# Patient Record
Sex: FEMALE | Race: WHITE | Employment: UNEMPLOYED | URBAN - METROPOLITAN AREA
[De-identification: names, ages, dates, MRNs, and addresses within clinical notes are randomized per-mention and may not be internally consistent; named-entity substitution may affect disease eponyms.]

---

## 2017-06-25 ENCOUNTER — HOSPITAL ENCOUNTER (EMERGENCY)
Facility: HOSPITAL | Age: 52
Discharge: HOME | End: 2017-06-25
Payer: COMMERCIAL

## 2017-06-25 ENCOUNTER — APPOINTMENT (OUTPATIENT)
Dept: GENERAL RADIOLOGY | Facility: HOSPITAL | Age: 52
End: 2017-06-25
Attending: NURSE PRACTITIONER
Payer: COMMERCIAL

## 2017-06-25 VITALS
BODY MASS INDEX: 28.89 KG/M2 | TEMPERATURE: 98 F | OXYGEN SATURATION: 98 % | WEIGHT: 153 LBS | SYSTOLIC BLOOD PRESSURE: 132 MMHG | HEART RATE: 68 BPM | HEIGHT: 61 IN | DIASTOLIC BLOOD PRESSURE: 82 MMHG | RESPIRATION RATE: 18 BRPM
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
## 2017-06-25 DIAGNOSIS — S61.259A CAT BITE OF FINGER, INITIAL ENCOUNTER: Primary | ICD-10-CM

## 2017-06-25 PROCEDURE — 96365 THER/PROPH/DIAG IV INF INIT: CPT

## 2017-06-25 PROCEDURE — 73130 X-RAY EXAM OF HAND: CPT | Performed by: NURSE PRACTITIONER

## 2017-06-25 PROCEDURE — 99284 EMERGENCY DEPT VISIT MOD MDM: CPT

## 2017-06-25 RX ORDER — IBUPROFEN 600 MG/1
600 TABLET ORAL ONCE
Status: COMPLETED | OUTPATIENT
Start: 2017-06-25 | End: 2017-06-25

## 2017-06-25 RX ORDER — AMOXICILLIN AND CLAVULANATE POTASSIUM 875; 125 MG/1; MG/1
1 TABLET, FILM COATED ORAL 2 TIMES DAILY
Qty: 20 TABLET | Refills: 0 | Status: ON HOLD | OUTPATIENT
Start: 2017-06-25 | End: 2017-06-27

## 2017-06-25 NOTE — ED PROVIDER NOTES
Patient Seen in: [facility] Emergency Department    History   Patient presents with:  Bite (integumentary)    Stated Complaint: cat bite    HPI    50-year-old female to the emergency department after her cat bit her on the right hand thumb, there small swelling noted, no redness, warmth, proximal streaking  PSYCH: calm, cooperative,        ED Course   Labs Reviewed - No data to display    MDM           Radiology findings: Xr Hand (min 3 Views), Right (cpt=73130)    Result Date: 6/25/2017  CONCLUSIO

## 2017-06-26 ENCOUNTER — HOSPITAL ENCOUNTER (INPATIENT)
Facility: HOSPITAL | Age: 52
LOS: 2 days | Discharge: HOME | DRG: 580 | End: 2017-06-28
Attending: EMERGENCY MEDICINE | Admitting: HOSPITALIST
Payer: COMMERCIAL
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
## 2017-06-26 DIAGNOSIS — L03.113 CELLULITIS OF RIGHT UPPER EXTREMITY: ICD-10-CM

## 2017-06-26 LAB
ANION GAP SERPL CALC-SCNC: 6 MMOL/L (ref 0–18)
BASOPHILS # BLD: 0 K/UL (ref 0–0.2)
BASOPHILS NFR BLD: 1 %
BUN SERPL-MCNC: 13 MG/DL (ref 8–20)
BUN/CREAT SERPL: 21 (ref 10–20)
CALCIUM SERPL-MCNC: 8.8 MG/DL (ref 8.5–10.5)
CHLORIDE SERPL-SCNC: 107 MMOL/L (ref 95–110)
CO2 SERPL-SCNC: 26 MMOL/L (ref 22–32)
CREAT SERPL-MCNC: 0.62 MG/DL (ref 0.5–1.5)
EOSINOPHIL # BLD: 0.2 K/UL (ref 0–0.7)
EOSINOPHIL NFR BLD: 3 %
ERYTHROCYTE [DISTWIDTH] IN BLOOD BY AUTOMATED COUNT: 12.9 % (ref 11–15)
GLUCOSE SERPL-MCNC: 107 MG/DL (ref 70–99)
HCT VFR BLD AUTO: 40.6 % (ref 35–48)
HGB BLD-MCNC: 13.8 G/DL (ref 12–16)
LYMPHOCYTES # BLD: 1.1 K/UL (ref 1–4)
LYMPHOCYTES NFR BLD: 15 %
MCH RBC QN AUTO: 32.9 PG (ref 27–32)
MCHC RBC AUTO-ENTMCNC: 34.1 G/DL (ref 32–37)
MCV RBC AUTO: 96.3 FL (ref 80–100)
MONOCYTES # BLD: 0.6 K/UL (ref 0–1)
MONOCYTES NFR BLD: 8 %
NEUTROPHILS # BLD AUTO: 5.4 K/UL (ref 1.8–7.7)
NEUTROPHILS NFR BLD: 74 %
OSMOLALITY UR CALC.SUM OF ELEC: 289 MOSM/KG (ref 275–295)
PLATELET # BLD AUTO: 168 K/UL (ref 140–400)
PMV BLD AUTO: 8.7 FL (ref 7.4–10.3)
POTASSIUM SERPL-SCNC: 3.6 MMOL/L (ref 3.3–5.1)
RBC # BLD AUTO: 4.21 M/UL (ref 3.7–5.4)
SODIUM SERPL-SCNC: 139 MMOL/L (ref 136–144)
WBC # BLD AUTO: 7.3 K/UL (ref 4–11)

## 2017-06-26 PROCEDURE — 99222 1ST HOSP IP/OBS MODERATE 55: CPT | Performed by: HOSPITALIST

## 2017-06-26 RX ORDER — ACETAMINOPHEN 325 MG/1
650 TABLET ORAL EVERY 6 HOURS PRN
Status: DISCONTINUED | OUTPATIENT
Start: 2017-06-26 | End: 2017-06-28

## 2017-06-26 RX ORDER — SODIUM CHLORIDE 9 MG/ML
INJECTION, SOLUTION INTRAVENOUS
Status: COMPLETED
Start: 2017-06-26 | End: 2017-06-26

## 2017-06-26 RX ORDER — POLYETHYLENE GLYCOL 3350 17 G/17G
17 POWDER, FOR SOLUTION ORAL DAILY PRN
Status: DISCONTINUED | OUTPATIENT
Start: 2017-06-26 | End: 2017-06-28

## 2017-06-26 RX ORDER — TRAMADOL HYDROCHLORIDE 50 MG/1
50 TABLET ORAL EVERY 6 HOURS PRN
Status: DISCONTINUED | OUTPATIENT
Start: 2017-06-26 | End: 2017-06-28

## 2017-06-26 RX ORDER — TRAMADOL HYDROCHLORIDE 50 MG/1
100 TABLET ORAL EVERY 6 HOURS PRN
Status: DISCONTINUED | OUTPATIENT
Start: 2017-06-26 | End: 2017-06-28

## 2017-06-26 RX ORDER — ONDANSETRON 2 MG/ML
4 INJECTION INTRAMUSCULAR; INTRAVENOUS EVERY 6 HOURS PRN
Status: DISCONTINUED | OUTPATIENT
Start: 2017-06-26 | End: 2017-06-28

## 2017-06-26 RX ORDER — SODIUM PHOSPHATE, DIBASIC AND SODIUM PHOSPHATE, MONOBASIC 7; 19 G/133ML; G/133ML
1 ENEMA RECTAL ONCE AS NEEDED
Status: DISCONTINUED | OUTPATIENT
Start: 2017-06-26 | End: 2017-06-28

## 2017-06-26 RX ORDER — TEMAZEPAM 7.5 MG/1
7.5 CAPSULE ORAL NIGHTLY PRN
Status: DISCONTINUED | OUTPATIENT
Start: 2017-06-26 | End: 2017-06-28

## 2017-06-26 RX ORDER — BISACODYL 10 MG
10 SUPPOSITORY, RECTAL RECTAL
Status: DISCONTINUED | OUTPATIENT
Start: 2017-06-26 | End: 2017-06-28

## 2017-06-26 RX ORDER — PROPRANOLOL HYDROCHLORIDE 20 MG/1
20 TABLET ORAL 2 TIMES DAILY
COMMUNITY
End: 2019-03-22

## 2017-06-26 RX ORDER — MORPHINE SULFATE 2 MG/ML
2 INJECTION, SOLUTION INTRAMUSCULAR; INTRAVENOUS EVERY 4 HOURS PRN
Status: DISCONTINUED | OUTPATIENT
Start: 2017-06-26 | End: 2017-06-28

## 2017-06-26 RX ORDER — 0.9 % SODIUM CHLORIDE 0.9 %
VIAL (ML) INJECTION
Status: COMPLETED
Start: 2017-06-26 | End: 2017-06-26

## 2017-06-26 RX ORDER — DIAPER,BRIEF,INFANT-TODD,DISP
EACH MISCELLANEOUS AS NEEDED
Status: DISCONTINUED | OUTPATIENT
Start: 2017-06-26 | End: 2017-06-28

## 2017-06-26 RX ORDER — PROPRANOLOL HYDROCHLORIDE 20 MG/1
20 TABLET ORAL 2 TIMES DAILY
Status: DISCONTINUED | OUTPATIENT
Start: 2017-06-26 | End: 2017-06-28

## 2017-06-26 NOTE — PROGRESS NOTES
[facility] dosing service    Initial Pharmacokinetic Consult for Vancomycin Dosing     Claudeen Peals is a 47year old female who is being treated for cellulitis.   Pharmacy has been asked to dose Vancomycin by Dr. Phebe Brittle    She is allergic to fish oil an

## 2017-06-26 NOTE — CONSULTS
[facility]    Report of Consultation    Deedee Brices Patient Status:  Inpatient    3/15/1965 MRN [id number]   Location [facility] 5SW/SE Attending Lu Raman MD   Hosp Day # 0 PCP Sofía DUFF     Date of Admission:  2017 Pertinent items are noted in HPI. A comprehensive review of systems was negative. Physical Exam:   Blood pressure 121/55, pulse 59, temperature 97.7 °F (36.5 °C), temperature source Oral, resp.  rate 20, height 5' 1\" (1.549 m), weight 153 lb (69.4 kg), elevate, BR with bathroom privileges. Proper way to ice and elevate and exercises demonstrated to patient. Will make NPO tonight and reassess progress in AM.  If developing abscess, will plan on I&D.   She voices understanding of above plan and is in [facility]

## 2017-06-26 NOTE — ED PROVIDER NOTES
Patient Seen in: [facility] 5sw/se    History   Patient presents with:  Bite (integumentary)    Stated Complaint: bit by cat    HPI    50-year-old female presents for evaluation of a cat bite.   Patient seen here yesterday, was bitten by her cat an atraumatic. Mouth/Throat: Oropharynx is clear and moist.   Eyes: Conjunctivae and EOM are normal.   Neck: Normal range of motion. Neck supple. Cardiovascular: Normal rate, regular rhythm, normal heart sounds and intact distal pulses.     Pulmonary/Chest CULTURE   BLOOD CULTURE   MRSA SCREEN BY PCR       ============================================================  ED Course  ------------------------------------------------------------  Time: 06/26 0808  Comment: Discussed with Dr. Georgiana Bo, no additional

## 2017-06-26 NOTE — ED NOTES
Care assumed. Alert and interactive S/P cat bite to R hand - 2 puncture areas on posterior aspect proximal wound with small amount of pus + edematous, indurated and painful. Wound irrigation and DSD applied. Defers analgesia at present.  ED Medic at bedside

## 2017-06-26 NOTE — ED INITIAL ASSESSMENT (HPI)
Pt reports she was bitten by her cat yesterday morning to her right hand. Seen/treated for same with iv abx, has had worsening swelling/pain.

## 2017-06-26 NOTE — PROGRESS NOTES
06/26/17 1529   Clinical Encounter Type   Visited With Patient   Routine Visit Introduction   Continue Visiting Yes   Referral From Patient;Nurse   Referral To    Patient Spiritual Encounters   Spiritual Needs Empathic listening    Spiritual Int

## 2017-06-26 NOTE — PROGRESS NOTES
[facility] Patient Status:  Inpatient    3/15/1965 MRN [id number]   Location [facility] 5SW/SE Attending Octavio Conde MD   Hosp Day # 0 PCP Rosemary DUFF     Date:  2017     Date of Admissi swelling  Extremities:  No edema, no cyanosis, no clubbing.  R hand erythema on dorsum with swelling  Neurologic:  nonfocal  Psychiatric:  Normal affect, calm and appropriate  Skin:  No rash, no lesion    Results:    Recent Labs   Lab  06/26/17   0647   WBC

## 2017-06-26 NOTE — CONSULTS
[facility]    Report of Consultation    Zena Vega Patient Status:  Inpatient    3/15/1965 MRN [id number]   Location [facility] 5SW/SE Attending Jacinta Taylor MD   Hosp Day # 0 PCP Chirag Alexandre     Date of Admission:  2017  D Anaphylaxis  Seafood                 Anaphylaxis    Review of Systems:    Pertinent items are noted in HPI. Physical Exam:   Blood pressure 121/55, pulse 59, temperature 97.7 °F (36.5 °C), temperature source Oral, resp.  rate 20, height 154.9

## 2017-06-27 ENCOUNTER — APPOINTMENT (OUTPATIENT)
Dept: MRI IMAGING | Facility: HOSPITAL | Age: 52
DRG: 580 | End: 2017-06-27
Attending: ORTHOPAEDIC SURGERY
Payer: COMMERCIAL

## 2017-06-27 ENCOUNTER — SURGERY (OUTPATIENT)
Age: 52
End: 2017-06-27

## 2017-06-27 ENCOUNTER — ANESTHESIA (OUTPATIENT)
Dept: SURGERY | Facility: HOSPITAL | Age: 52
DRG: 580 | End: 2017-06-27
Payer: COMMERCIAL

## 2017-06-27 ENCOUNTER — ANESTHESIA EVENT (OUTPATIENT)
Dept: SURGERY | Facility: HOSPITAL | Age: 52
DRG: 580 | End: 2017-06-27
Payer: COMMERCIAL

## 2017-06-27 LAB
ANION GAP SERPL CALC-SCNC: 8 MMOL/L (ref 0–18)
BASOPHILS # BLD: 0 K/UL (ref 0–0.2)
BASOPHILS NFR BLD: 1 %
BUN SERPL-MCNC: 10 MG/DL (ref 8–20)
BUN/CREAT SERPL: 17.2 (ref 10–20)
CALCIUM SERPL-MCNC: 8.2 MG/DL (ref 8.5–10.5)
CHLORIDE SERPL-SCNC: 105 MMOL/L (ref 95–110)
CO2 SERPL-SCNC: 25 MMOL/L (ref 22–32)
CREAT SERPL-MCNC: 0.58 MG/DL (ref 0.5–1.5)
EOSINOPHIL # BLD: 0.2 K/UL (ref 0–0.7)
EOSINOPHIL NFR BLD: 5 %
ERYTHROCYTE [DISTWIDTH] IN BLOOD BY AUTOMATED COUNT: 12.8 % (ref 11–15)
GLUCOSE SERPL-MCNC: 98 MG/DL (ref 70–99)
HCT VFR BLD AUTO: 40.5 % (ref 35–48)
HGB BLD-MCNC: 13.8 G/DL (ref 12–16)
LYMPHOCYTES # BLD: 1.4 K/UL (ref 1–4)
LYMPHOCYTES NFR BLD: 27 %
MCH RBC QN AUTO: 33.2 PG (ref 27–32)
MCHC RBC AUTO-ENTMCNC: 34 G/DL (ref 32–37)
MCV RBC AUTO: 97.8 FL (ref 80–100)
MONOCYTES # BLD: 0.3 K/UL (ref 0–1)
MONOCYTES NFR BLD: 6 %
MRSA DNA SPEC QL NAA+PROBE: NEGATIVE
NEUTROPHILS # BLD AUTO: 3.1 K/UL (ref 1.8–7.7)
NEUTROPHILS NFR BLD: 62 %
OSMOLALITY UR CALC.SUM OF ELEC: 285 MOSM/KG (ref 275–295)
PLATELET # BLD AUTO: 146 K/UL (ref 140–400)
PMV BLD AUTO: 9 FL (ref 7.4–10.3)
POTASSIUM SERPL-SCNC: 3.7 MMOL/L (ref 3.3–5.1)
RBC # BLD AUTO: 4.14 M/UL (ref 3.7–5.4)
SODIUM SERPL-SCNC: 138 MMOL/L (ref 136–144)
WBC # BLD AUTO: 5.1 K/UL (ref 4–11)

## 2017-06-27 PROCEDURE — 99232 SBSQ HOSP IP/OBS MODERATE 35: CPT | Performed by: HOSPITALIST

## 2017-06-27 PROCEDURE — 73218 MRI UPPER EXTREMITY W/O DYE: CPT | Performed by: ORTHOPAEDIC SURGERY

## 2017-06-27 PROCEDURE — 0J9J0ZZ DRAINAGE OF RIGHT HAND SUBCUTANEOUS TISSUE AND FASCIA, OPEN APPROACH: ICD-10-PCS | Performed by: ORTHOPAEDIC SURGERY

## 2017-06-27 RX ORDER — IBUPROFEN 400 MG/1
400 TABLET ORAL EVERY 6 HOURS PRN
Status: DISCONTINUED | OUTPATIENT
Start: 2017-06-27 | End: 2017-06-28

## 2017-06-27 RX ORDER — AMOXICILLIN AND CLAVULANATE POTASSIUM 875; 125 MG/1; MG/1
1 TABLET, FILM COATED ORAL 2 TIMES DAILY
Qty: 14 TABLET | Refills: 0 | Status: SHIPPED | OUTPATIENT
Start: 2017-06-27 | End: 2017-07-04

## 2017-06-27 RX ORDER — SULFAMETHOXAZOLE AND TRIMETHOPRIM 800; 160 MG/1; MG/1
1 TABLET ORAL 2 TIMES DAILY
Qty: 14 TABLET | Refills: 0 | Status: SHIPPED | OUTPATIENT
Start: 2017-06-27 | End: 2017-07-04

## 2017-06-27 RX ORDER — IBUPROFEN 600 MG/1
600 TABLET ORAL EVERY 6 HOURS PRN
Status: DISCONTINUED | OUTPATIENT
Start: 2017-06-27 | End: 2017-06-28

## 2017-06-27 RX ORDER — 0.9 % SODIUM CHLORIDE 0.9 %
VIAL (ML) INJECTION
Status: COMPLETED
Start: 2017-06-27 | End: 2017-06-27

## 2017-06-27 RX ORDER — TRAMADOL HYDROCHLORIDE 50 MG/1
100 TABLET ORAL EVERY 6 HOURS PRN
Qty: 20 TABLET | Refills: 0 | Status: SHIPPED
Start: 2017-06-27 | End: 2019-03-22

## 2017-06-27 RX ORDER — POTASSIUM CHLORIDE 20 MEQ/1
40 TABLET, EXTENDED RELEASE ORAL ONCE
Status: DISCONTINUED | OUTPATIENT
Start: 2017-06-27 | End: 2017-06-28

## 2017-06-27 RX ORDER — POTASSIUM CHLORIDE 14.9 MG/ML
20 INJECTION INTRAVENOUS ONCE
Status: COMPLETED | OUTPATIENT
Start: 2017-06-27 | End: 2017-06-27

## 2017-06-27 RX ORDER — ONDANSETRON 2 MG/ML
4 INJECTION INTRAMUSCULAR; INTRAVENOUS ONCE AS NEEDED
Status: DISCONTINUED | OUTPATIENT
Start: 2017-06-27 | End: 2017-06-27 | Stop reason: HOSPADM

## 2017-06-27 RX ORDER — BUPIVACAINE HYDROCHLORIDE 5 MG/ML
INJECTION, SOLUTION EPIDURAL; INTRACAUDAL AS NEEDED
Status: DISCONTINUED | OUTPATIENT
Start: 2017-06-27 | End: 2017-06-27 | Stop reason: HOSPADM

## 2017-06-27 RX ORDER — SODIUM CHLORIDE, SODIUM LACTATE, POTASSIUM CHLORIDE, CALCIUM CHLORIDE 600; 310; 30; 20 MG/100ML; MG/100ML; MG/100ML; MG/100ML
INJECTION, SOLUTION INTRAVENOUS CONTINUOUS
Status: DISCONTINUED | OUTPATIENT
Start: 2017-06-27 | End: 2017-06-28

## 2017-06-27 NOTE — PROGRESS NOTES
[facility] Dosing Service  Antibiotic Dosing    Jerrica Grandchild is a 47year old female for whom pharmacy is dosing 6/26 for treatment of  cellulitis and Pneumonia.  .  Other antibiotics (Not dosed by pharmacy): none    Allergies: is allergic to fish oil; se

## 2017-06-27 NOTE — ANESTHESIA PREPROCEDURE EVALUATION
Anesthesia PreOp Note    HPI:     Zena Vega is a 47year old female who presents for preoperative consultation requested by: Remington Roche MD    Date of Surgery: 6/26/2017 - 6/27/2017    Procedure(s):  EXTREMITY UPPER IRRIGATION & DEBRIDEMENT 650 mg 650 mg Oral Q6H PRN Bartolo Bamberger, MD     [facility] Hold] temazepam (RESTORIL) cap 7.5 mg 7.5 mg Oral Nightly PRN Bartolo Bamberger, MD     [facility] Hold] ondansetron HCl [facility]) injection 4 mg 4 mg Intravenous Q6H PRN Bartolo Bamberger, MD     [MAR Hold] PEG 3350 (MIRALAX) po activity: Not on file     Other Topics Concern   None on file     Social History Narrative   None on file       Available pre-op labs reviewed.     Lab Results  Component Value Date   WBC 5.1 06/27/2017   RBC 4.14 06/27/2017   HGB 13.8 06/27/2017   HCT 40.5 complications, and any alternative forms of anesthetic management. All of the patient's questions were answered to the best of my ability. The patient desires the anesthetic management as planned.   Raven Allen  6/27/2017 6:38 PM

## 2017-06-27 NOTE — PROGRESS NOTES
INFECTIOUS DISEASE PROGRESS NOTE    Cathleen Darby Patient Status:  Inpatient    3/15/1965 MRN [id number]   Location [facility] 5SW/SE Attending Azul Link MD   Hosp Day # 1 PCP [facility]     Subjective:  Patient seen and examined, notes, labs extensor pollicis longus,   extensor digitorum, extensor digiti minimi and extensor indices tendons (distal aspects of the third through fifth extensor compartments), however the fluid does not track into the tendon sheath seen along the proximal dorsal as

## 2017-06-27 NOTE — PROGRESS NOTES
[facility]    Progress Note    Jerrica Pedroza Patient Status:  Inpatient    3/15/1965 MRN [id number]   Location [facility] 5SW/SE Attending Blanco Jarquin MD   Hosp Day # 1 PCP [facility]     Subjective:  R hand feels about the same Q24H   • potassium chloride  20 mEq Intravenous Once   • Propranolol HCl  20 mg Oral BID      PRN Meds: Bacitracin Zinc, acetaminophen, temazepam, ondansetron HCl, PEG 3350, magnesium hydroxide, bisacodyl, FLEET ENEMA, TraMADol HCl, TraMADol HCl, morphINE

## 2017-06-27 NOTE — BRIEF OP NOTE
Pre-Operative Diagnosis: Cat bite [W55.01XA], right hand cellulitis, right hand abscess     Post-Operative Diagnosis: Cat bite [W55.01XA], right hand cellulitis, right hand abscess     Procedure Performed:   Procedure(s):  right hand incision and draina

## 2017-06-27 NOTE — PLAN OF CARE
Problem: Patient/Family Goals  Goal: Patient/Family Long Term Goal  Patient's Long Term Goal: Go home    Interventions:  - Antibiotics IV   - Wound site care  - Laboratory monitoring  - See additional Care Plan goals for specific interventions    Outcome:

## 2017-06-27 NOTE — ANESTHESIA POSTPROCEDURE EVALUATION
Patient: Zena Vega    Procedure Summary     Date:  06/27/17 Room / Location:  [address] [place] [address] MAIN OR 12 / [address] [place] [address] MAIN OR    Anesthesia Start:  1802 Anesthesia Stop:      Procedure:  EXTREMITY UPPER IRRIGATION & DEBRIDEMENT (Right Hand) Diagnosis:       Cat bite

## 2017-06-27 NOTE — H&P
Mady 60 Patient Status:  Inpatient    3/15/1965 MRN [id number]   Location [facility] 5SW/SE Attending Avery Mckoy MD   Hosp Day # 0 PCP [facility]      Date:  2017     Date o sounds  Musculoskeletal:  No joint swelling  Extremities:  No edema, no cyanosis, no clubbing.  R hand erythema on dorsum with swelling  Neurologic:  nonfocal  Psychiatric:  Normal affect, calm and appropriate  Skin:  No rash, no lesion     Results:

## 2017-06-28 VITALS
HEART RATE: 93 BPM | OXYGEN SATURATION: 95 % | TEMPERATURE: 97 F | WEIGHT: 153 LBS | RESPIRATION RATE: 18 BRPM | DIASTOLIC BLOOD PRESSURE: 48 MMHG | BODY MASS INDEX: 28.89 KG/M2 | HEIGHT: 61 IN | SYSTOLIC BLOOD PRESSURE: 100 MMHG

## 2017-06-28 LAB — POTASSIUM SERPL-SCNC: 3.7 MMOL/L (ref 3.3–5.1)

## 2017-06-28 PROCEDURE — 99239 HOSP IP/OBS DSCHRG MGMT >30: CPT | Performed by: HOSPITALIST

## 2017-06-28 RX ORDER — POTASSIUM CHLORIDE 20 MEQ/1
40 TABLET, EXTENDED RELEASE ORAL ONCE
Status: COMPLETED | OUTPATIENT
Start: 2017-06-28 | End: 2017-06-28

## 2017-06-28 RX ORDER — SODIUM CHLORIDE 9 MG/ML
INJECTION, SOLUTION INTRAVENOUS
Status: COMPLETED
Start: 2017-06-28 | End: 2017-06-28

## 2017-06-28 RX ORDER — 0.9 % SODIUM CHLORIDE 0.9 %
VIAL (ML) INJECTION
Status: COMPLETED
Start: 2017-06-28 | End: 2017-06-28

## 2017-06-28 NOTE — DISCHARGE SUMMARY
[facility] - [facility]    Discharge Summary    Shira Nuñez Patient Status:  Inpatient    3/15/1965 MRN [id number]   Location [facility] 5SW/SE Attending No att. providers found   Hosp Day # 2 PCP Ferny Aranda     Date of Admission:  office tomorrow  - home on augmentin 875 BID and bactrim DS 1 TAB BID for 7 days- scripts given  - tylenol, tramadol prn- script given      H/o arrythmia, palpitations - unclear of specific problem.  Not afib  - con't propanalol       Complications: none 7/4/2017  Quantity:  14 tablet  Refills:  0     TraMADol HCl 50 MG Tabs  Commonly known as:  ULTRAM      Take 2 tablets (100 mg total) by mouth every 6 (six) hours as needed.    Quantity:  20 tablet  Refills:  0        CONTINUE taking these medications

## 2017-06-28 NOTE — PROGRESS NOTES
INFECTIOUS DISEASE PROGRESS NOTE    Leena Colon Patient Status:  Inpatient    3/15/1965 MRN [id number]   Location [facility] 5SW/SE Attending Regi Lorenzo MD   Hosp Day # 2 PCP [facility]     Subjective:  Patient seen and examined, notes, labs collection to suggest abscess.  The fluid abuts the tendon sheaths of the extensor pollicis longus,   extensor digitorum, extensor digiti minimi and extensor indices tendons (distal aspects of the third through fifth extensor compartments), however the flui

## 2017-06-28 NOTE — PLAN OF CARE
Patient on electrolyte protocol. IV potassium ordered r/t NPO status. Potassium hung with NS @ 100 cc/hour for dilution. Patient complained of intense burning from arm into chest. Potassium stopped and flushed.  Night RN endorsed for electrolyte replacement

## 2017-07-11 NOTE — PAYOR COMM NOTE
--------------  DISCHARGE REVIEW    [facility]  Subscriber #:  [id number]  Authorization Number: [id number]    Admit date: 6/26/2017  6:16 AM  Discharge Date: 6/28/2017  3:39 PM     Admitting Physician: Ora Villalobos MD  Attending Natali Johnson after undergoing debridement by Dr. Suha Adrian. She will be discharged on oral antibiotics. Discharge Physical Exam:   Physical Exam:    General: No acute distress. Respiratory: Clear to auscultation bilaterally. No wheezes. No rhonchi.   Cardiovascular Script printed, Disp-14 tablet, R-0      Home Meds - Modified    Amoxicillin-Pot Clavulanate 875-125 MG Oral Tab  Take 1 tablet by mouth 2 (two) times daily. , Script printed, Disp-14 tablet, R-0      Home Meds - Unchanged    Propranolol HCl 20 MG Oral Tab 51308  [phone]             Ksenia Umaña In 1 week.     Specialty:  Family Practice  Contact information:  Josh   [phone]                   Follow up Labs and imaging: none        Time spent:  > 30 minutes    Anitra Scruggs Father        Smoking status: Never Smoker                                                              Alcohol use: Yes           1.2 oz/week     Glasses of wine: 2 per week      Review of Systems    Positive for stated complaint: bit by cat  Other system fluctuance or induration   Psychiatric: She has a normal mood and affect. Nursing note and vitals reviewed.           ED Course     Labs Reviewed   BASIC METABOLIC PANEL (8) - Abnormal; Notable for the following:        Result Value    Glucose 107 (*) Discharge Medication List        Present on Admission           ICD-10-CM Noted POA    * (Principal)Cellulitis of right upper extremity L03.113 6/26/2017 Unknown    Cat bite, initial encounter W55. 01XA 6/26/2017[TN.1]                 Signed by Derek King Amoxicillin-Pot Clavulanate 875-125 MG Oral Tab,  Take 1 tablet by mouth 2 (two) times daily.        Review of Systems:  A comprehensive 12 point review of systems was negative.  See History of Present Illness for other pertinent details.     Physical Exam:

## 2019-03-22 ENCOUNTER — OFFICE VISIT (OUTPATIENT)
Dept: OBGYN CLINIC | Facility: CLINIC | Age: 54
End: 2019-03-22
Payer: COMMERCIAL

## 2019-03-22 VITALS
SYSTOLIC BLOOD PRESSURE: 114 MMHG | HEIGHT: 61 IN | DIASTOLIC BLOOD PRESSURE: 78 MMHG | BODY MASS INDEX: 32.4 KG/M2 | WEIGHT: 171.63 LBS | HEART RATE: 67 BPM

## 2019-03-22 DIAGNOSIS — Z12.39 BREAST CANCER SCREENING: ICD-10-CM

## 2019-03-22 DIAGNOSIS — Z01.419 ENCOUNTER FOR GYNECOLOGICAL EXAMINATION WITHOUT ABNORMAL FINDING: Primary | ICD-10-CM

## 2019-03-22 PROBLEM — L03.113 CELLULITIS OF RIGHT UPPER EXTREMITY: Status: RESOLVED | Noted: 2017-06-26 | Resolved: 2019-03-22
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
## 2019-03-22 PROBLEM — Z53.20 COLONOSCOPY REFUSED: Status: ACTIVE | Noted: 2019-03-22

## 2019-03-22 PROCEDURE — 87624 HPV HI-RISK TYP POOLED RSLT: CPT | Performed by: OBSTETRICS & GYNECOLOGY

## 2019-03-22 PROCEDURE — 99386 PREV VISIT NEW AGE 40-64: CPT | Performed by: OBSTETRICS & GYNECOLOGY

## 2019-03-22 RX ORDER — METOPROLOL SUCCINATE 25 MG/1
TABLET, EXTENDED RELEASE ORAL
Refills: 3 | COMMUNITY
Start: 2019-02-16

## 2019-03-22 NOTE — PROGRESS NOTES
GYN H&P     3/22/2019  10:27 AM    CC: Patient is here for annual.     HPI: Patient is a 53year old  for annual. She has no complaints. She is sexually active and has minimal pain with sex (partner does not get full erection).  No further hot flashe 1989        Years since quittin.2      Smokeless tobacco: Never Used      Tobacco comment: over 30 years ago    Substance and Sexual Activity      Alcohol use:  Yes        Alcohol/week: 1.2 oz        Types: 2 Glasses of wine per week        Frequency: MD Juana

## 2019-03-24 LAB — HPV I/H RISK 1 DNA SPEC QL NAA+PROBE: NEGATIVE

## 2019-04-23 ENCOUNTER — HOSPITAL ENCOUNTER (OUTPATIENT)
Dept: MAMMOGRAPHY | Facility: HOSPITAL | Age: 54
Discharge: HOME | End: 2019-04-23
Attending: OBSTETRICS & GYNECOLOGY
Payer: COMMERCIAL

## 2019-04-23 DIAGNOSIS — Z12.39 BREAST CANCER SCREENING: ICD-10-CM

## 2019-04-23 PROCEDURE — 77067 SCR MAMMO BI INCL CAD: CPT | Performed by: OBSTETRICS & GYNECOLOGY

## 2019-04-23 PROCEDURE — 77063 BREAST TOMOSYNTHESIS BI: CPT | Performed by: OBSTETRICS & GYNECOLOGY

## 2019-11-25 RX ORDER — METOPROLOL SUCCINATE 25 MG/1
1 TABLET, EXTENDED RELEASE ORAL DAILY
COMMUNITY
Start: 2019-02-16 | End: 2019-11-26 | Stop reason: SDUPTHER

## 2019-11-26 ENCOUNTER — OFFICE VISIT (OUTPATIENT)
Dept: CARDIOLOGY | Age: 54
End: 2019-11-26

## 2019-11-26 VITALS
SYSTOLIC BLOOD PRESSURE: 120 MMHG | BODY MASS INDEX: 31.47 KG/M2 | HEIGHT: 62 IN | RESPIRATION RATE: 18 BRPM | DIASTOLIC BLOOD PRESSURE: 82 MMHG | OXYGEN SATURATION: 95 % | WEIGHT: 171 LBS | HEART RATE: 63 BPM

## 2019-11-26 DIAGNOSIS — R94.31 ABNORMAL ELECTROCARDIOGRAM (ECG) (EKG): ICD-10-CM

## 2019-11-26 DIAGNOSIS — I49.1 PAC (PREMATURE ATRIAL CONTRACTION): ICD-10-CM

## 2019-11-26 DIAGNOSIS — R00.2 PALPITATIONS: Primary | ICD-10-CM

## 2019-11-26 PROBLEM — E78.5 DYSLIPIDEMIA: Status: ACTIVE | Noted: 2019-11-26

## 2019-11-26 PROCEDURE — 93000 ELECTROCARDIOGRAM COMPLETE: CPT | Performed by: INTERNAL MEDICINE

## 2019-11-26 PROCEDURE — 99204 OFFICE O/P NEW MOD 45 MIN: CPT | Performed by: INTERNAL MEDICINE

## 2019-11-26 RX ORDER — METOPROLOL SUCCINATE 25 MG/1
25 TABLET, EXTENDED RELEASE ORAL DAILY
Qty: 90 TABLET | Refills: 3 | Status: SHIPPED | OUTPATIENT
Start: 2019-11-26 | End: 2020-11-30 | Stop reason: SDUPTHER

## 2019-11-26 ASSESSMENT — PATIENT HEALTH QUESTIONNAIRE - PHQ9
SUM OF ALL RESPONSES TO PHQ9 QUESTIONS 1 AND 2: 0
2. FEELING DOWN, DEPRESSED OR HOPELESS: NOT AT ALL
1. LITTLE INTEREST OR PLEASURE IN DOING THINGS: NOT AT ALL
SUM OF ALL RESPONSES TO PHQ9 QUESTIONS 1 AND 2: 0

## 2019-12-12 ENCOUNTER — LAB ENCOUNTER (OUTPATIENT)
Dept: LAB | Facility: HOSPITAL | Age: 54
End: 2019-12-12
Attending: INTERNAL MEDICINE
Payer: COMMERCIAL

## 2019-12-12 DIAGNOSIS — R00.2 PALPITATIONS: Primary | ICD-10-CM

## 2019-12-12 DIAGNOSIS — R94.31 ABNORMAL ELECTROCARDIOGRAM: ICD-10-CM

## 2019-12-12 LAB
ANION GAP SERPL CALC-SCNC: 6 MMOL/L
BUN SERPL-MCNC: 19 MG/DL
BUN/CREAT SERPL: 21.6
CALCIUM SERPL-MCNC: 9 MG/DL
CHLORIDE SERPL-SCNC: 106 MMOL/L
CHOLEST SERPL-MCNC: 210 MG/DL
CHOLEST/HDLC SERPL: NORMAL {RATIO}
CO2 SERPL-SCNC: 31 MMOL/L
CREAT SERPL-MCNC: 0.88 MG/DL
GLUCOSE SERPL-MCNC: 92 MG/DL
HDLC SERPL-MCNC: 57 MG/DL
LDLC SERPL CALC-MCNC: 127 MG/DL
LENGTH OF FAST TIME PATIENT: YES H
LENGTH OF FAST TIME PATIENT: YES H
NONHDLC SERPL-MCNC: 153 MG/DL
POTASSIUM SERPL-SCNC: 4.2 MMOL/L
SODIUM SERPL-SCNC: 143 MMOL/L
THYROID STIMULATING HORMONE: 1.67
TRIGL SERPL-MCNC: 130 MG/DL
VLDLC SERPL CALC-MCNC: 26 MG/DL

## 2019-12-12 PROCEDURE — 80061 LIPID PANEL: CPT

## 2019-12-12 PROCEDURE — 80048 BASIC METABOLIC PNL TOTAL CA: CPT

## 2019-12-12 PROCEDURE — 84443 ASSAY THYROID STIM HORMONE: CPT

## 2019-12-12 PROCEDURE — 36415 COLL VENOUS BLD VENIPUNCTURE: CPT

## 2019-12-13 ENCOUNTER — CLINICAL ABSTRACT (OUTPATIENT)
Dept: CARDIOLOGY | Age: 54
End: 2019-12-13

## 2019-12-13 ENCOUNTER — TELEPHONE (OUTPATIENT)
Dept: CARDIOLOGY | Age: 54
End: 2019-12-13

## 2019-12-21 ENCOUNTER — ADVANCED DIRECTIVES (OUTPATIENT)
Dept: OTHER | Age: 54
End: 2019-12-21

## 2020-01-09 ENCOUNTER — ANCILLARY PROCEDURE (OUTPATIENT)
Dept: CARDIOLOGY | Age: 55
End: 2020-01-09
Attending: INTERNAL MEDICINE

## 2020-01-09 DIAGNOSIS — R00.2 PALPITATIONS: ICD-10-CM

## 2020-01-09 DIAGNOSIS — R94.31 ABNORMAL ELECTROCARDIOGRAM (ECG) (EKG): ICD-10-CM

## 2020-01-09 PROCEDURE — 93306 TTE W/DOPPLER COMPLETE: CPT | Performed by: INTERNAL MEDICINE

## 2020-01-20 ENCOUNTER — TELEPHONE (OUTPATIENT)
Dept: CARDIOLOGY | Age: 55
End: 2020-01-20

## 2020-11-30 ENCOUNTER — OFFICE VISIT (OUTPATIENT)
Dept: CARDIOLOGY | Age: 55
End: 2020-11-30

## 2020-11-30 VITALS
BODY MASS INDEX: 29.81 KG/M2 | SYSTOLIC BLOOD PRESSURE: 120 MMHG | DIASTOLIC BLOOD PRESSURE: 68 MMHG | OXYGEN SATURATION: 98 % | RESPIRATION RATE: 18 BRPM | HEART RATE: 64 BPM | WEIGHT: 162 LBS | HEIGHT: 62 IN

## 2020-11-30 DIAGNOSIS — I49.1 PAC (PREMATURE ATRIAL CONTRACTION): ICD-10-CM

## 2020-11-30 DIAGNOSIS — E78.5 DYSLIPIDEMIA: ICD-10-CM

## 2020-11-30 DIAGNOSIS — R00.2 PALPITATIONS: Primary | ICD-10-CM

## 2020-11-30 PROCEDURE — 93000 ELECTROCARDIOGRAM COMPLETE: CPT | Performed by: INTERNAL MEDICINE

## 2020-11-30 PROCEDURE — 99214 OFFICE O/P EST MOD 30 MIN: CPT | Performed by: INTERNAL MEDICINE

## 2020-11-30 RX ORDER — METOPROLOL SUCCINATE 25 MG/1
25 TABLET, EXTENDED RELEASE ORAL DAILY
Qty: 90 TABLET | Refills: 3 | Status: SHIPPED | OUTPATIENT
Start: 2020-11-30

## 2020-11-30 ASSESSMENT — PATIENT HEALTH QUESTIONNAIRE - PHQ9
1. LITTLE INTEREST OR PLEASURE IN DOING THINGS: NOT AT ALL
CLINICAL INTERPRETATION OF PHQ9 SCORE: NO FURTHER SCREENING NEEDED
CLINICAL INTERPRETATION OF PHQ2 SCORE: NO FURTHER SCREENING NEEDED
SUM OF ALL RESPONSES TO PHQ9 QUESTIONS 1 AND 2: 0
2. FEELING DOWN, DEPRESSED OR HOPELESS: NOT AT ALL
SUM OF ALL RESPONSES TO PHQ9 QUESTIONS 1 AND 2: 0

## 2020-12-01 ENCOUNTER — TELEPHONE (OUTPATIENT)
Dept: CARDIOLOGY | Age: 55
End: 2020-12-01

## 2020-12-02 DIAGNOSIS — R00.2 PALPITATIONS: ICD-10-CM

## 2021-01-01 ENCOUNTER — EXTERNAL RECORD (OUTPATIENT)
Dept: HEALTH INFORMATION MANAGEMENT | Facility: OTHER | Age: 56
End: 2021-01-01

## 2021-11-08 ENCOUNTER — TELEPHONE (OUTPATIENT)
Dept: OBGYN CLINIC | Facility: CLINIC | Age: 56
End: 2021-11-08

## 2021-11-08 NOTE — TELEPHONE ENCOUNTER
Pt calling to request mammogram order be placed. Please notify when order is available so pt can schedule accordingly.

## 2021-11-08 NOTE — TELEPHONE ENCOUNTER
LMTCB. Pt has not been seen since 2019. Pt needs a well womens exam.     PHONE ROOM: pt needs to be scheduled.

## 2021-11-09 ENCOUNTER — TELEPHONE (OUTPATIENT)
Dept: CARDIOLOGY | Age: 56
End: 2021-11-09

## 2021-11-19 ENCOUNTER — APPOINTMENT (OUTPATIENT)
Dept: CARDIOLOGY | Age: 56
End: 2021-11-19

## 2021-11-26 ENCOUNTER — IMMUNIZATION (OUTPATIENT)
Dept: LAB | Facility: HOSPITAL | Age: 56
End: 2021-11-26
Attending: EMERGENCY MEDICINE
Payer: COMMERCIAL

## 2021-11-26 DIAGNOSIS — Z23 NEED FOR VACCINATION: Primary | ICD-10-CM

## 2021-11-26 PROCEDURE — 0004A SARSCOV2 VAC 30MCG/0.3ML IM: CPT

## 2021-12-21 ENCOUNTER — OFFICE VISIT (OUTPATIENT)
Dept: OBGYN CLINIC | Facility: CLINIC | Age: 56
End: 2021-12-21
Payer: COMMERCIAL

## 2021-12-21 VITALS
HEIGHT: 61 IN | SYSTOLIC BLOOD PRESSURE: 120 MMHG | WEIGHT: 165 LBS | BODY MASS INDEX: 31.15 KG/M2 | DIASTOLIC BLOOD PRESSURE: 78 MMHG

## 2021-12-21 DIAGNOSIS — Z01.419 ENCOUNTER FOR GYNECOLOGICAL EXAMINATION WITHOUT ABNORMAL FINDING: ICD-10-CM

## 2021-12-21 DIAGNOSIS — Z12.31 ENCOUNTER FOR SCREENING MAMMOGRAM FOR MALIGNANT NEOPLASM OF BREAST: ICD-10-CM

## 2021-12-21 DIAGNOSIS — Z12.11 COLON CANCER SCREENING: Primary | ICD-10-CM

## 2021-12-21 PROCEDURE — 3008F BODY MASS INDEX DOCD: CPT | Performed by: OBSTETRICS & GYNECOLOGY

## 2021-12-21 PROCEDURE — 3074F SYST BP LT 130 MM HG: CPT | Performed by: OBSTETRICS & GYNECOLOGY

## 2021-12-21 PROCEDURE — 99396 PREV VISIT EST AGE 40-64: CPT | Performed by: OBSTETRICS & GYNECOLOGY

## 2021-12-21 PROCEDURE — 3078F DIAST BP <80 MM HG: CPT | Performed by: OBSTETRICS & GYNECOLOGY

## 2021-12-21 PROCEDURE — 87624 HPV HI-RISK TYP POOLED RSLT: CPT | Performed by: OBSTETRICS & GYNECOLOGY

## 2021-12-21 NOTE — PROGRESS NOTES
GYN H&P     2021  9:34 AM    CC: Patient is here for annual.     HPI: Patient is a 61year old  for annual. No vaginal bleeding. No pain with sex. No pain with sex. No LMP recorded.  Patient is postmenopausal.    OB History    Para T Wine      Drug use: No      Sexual activity: Yes        Partners: Male    Social History    Social History Narrative      Live with spouse       Feel safe      Medications reviewed.  See active list.     /78   Ht 61\"   Wt 165 lb (74.8 kg)   Breastfee

## 2022-01-22 ENCOUNTER — HOSPITAL ENCOUNTER (OUTPATIENT)
Dept: MAMMOGRAPHY | Facility: HOSPITAL | Age: 57
Discharge: HOME | End: 2022-01-22
Attending: OBSTETRICS & GYNECOLOGY
Payer: COMMERCIAL

## 2022-01-22 DIAGNOSIS — Z12.31 ENCOUNTER FOR SCREENING MAMMOGRAM FOR MALIGNANT NEOPLASM OF BREAST: ICD-10-CM

## 2022-01-22 PROCEDURE — 77063 BREAST TOMOSYNTHESIS BI: CPT | Performed by: OBSTETRICS & GYNECOLOGY

## 2022-01-22 PROCEDURE — 77067 SCR MAMMO BI INCL CAD: CPT | Performed by: OBSTETRICS & GYNECOLOGY

## 2022-04-11 NOTE — LETTER
[facility]  [address], [place], [place]    Authorization for Surgical Operation and Procedure                               I hereby authorize Chong Jules MD, my physician and his/her assistants (if applicable), which may include medical students, residents, and/or fellows, to perform the following surgical operation/ procedure and administer such anesthesia as may be determined necessary by my physician: Operation/Procedure name (s) LAPAROSCOPIC POSSIBLE OPEN APPENDECTOMY on Cesia Moore   2.   I recognize that during the surgical operation/procedure, unforeseen conditions may necessitate additional or different procedures than those listed above.  I, therefore, further authorize and request that the above-named surgeon, assistants, or designees perform such procedures as are, in their judgment, necessary and desirable.    3.   My surgeon/physician has discussed prior to my surgery the potential benefits, risks and side effects of this procedure; the likelihood of achieving goals; and potential problems that might occur during recuperation.  They also discussed reasonable alternatives to the procedure, including risks, benefits, and side effects related to the alternatives and risks related to not receiving this procedure.  I have had all my questions answered and I acknowledge that no guarantee has been made as to the result that may be obtained.    4.   Should the need arise during my operation/procedure, which includes change of level of care prior to discharge, I also consent to the administration of blood and/or blood products.  Further, I understand that despite careful testing and screening of blood or blood products by collecting agencies, I may still be subject to ill effects as a result of receiving a blood transfusion and/or blood products.  The following are some, but not all, of the potential risks that can occur: fever and allergic reactions, hemolytic reactions,  transmission of diseases such as Hepatitis, AIDS and Cytomegalovirus (CMV) and fluid overload.  In the event that I wish to have an autologous transfusion of my own blood, or a directed donor transfusion, I will discuss this with my physician.  Check only if Refusing Blood or Blood Products  I understand refusal of blood or blood products as deemed necessary by my physician may have serious consequences to my condition to include possible death. I hereby assume responsibility for my refusal and release the hospital, its personnel, and my physicians from any responsibility for the consequences of my refusal.    o  Refuse   5.   I authorize the use of any specimen, organs, tissues, body parts or foreign objects that may be removed from my body during the operation/procedure for diagnosis, research or teaching purposes and their subsequent disposal by hospital authorities.  I also authorize the release of specimen test results and/or written reports to my treating physician on the hospital medical staff or other referring or consulting physicians involved in my care, at the discretion of the Pathologist or my treating physician.    6.   I consent to the photographing or videotaping of the operations or procedures to be performed, including appropriate portions of my body for medical, scientific, or educational purposes, provided my identity is not revealed by the pictures or by descriptive texts accompanying them.  If the procedure has been photographed/videotaped, the surgeon will obtain the original picture, image, videotape or CD.  The hospital will not be responsible for storage, release or maintenance of the picture, image, tape or CD.    7.   I consent to the presence of a  or observers in the operating room as deemed necessary by my physician or their designees.    8.   I recognize that in the event my procedure results in extended X-Ray/fluoroscopy time, I may develop a skin reaction.    9. If  I have a Do Not Attempt Resuscitation (DNAR) order in place, that status will be suspended while in the operating room, procedural suite, and during the recovery period unless otherwise explicitly stated by me (or a person authorized to consent on my behalf). The surgeon or my attending physician will determine when the applicable recovery period ends for purposes of reinstating the DNAR order.  10. Patients having a sterilization procedure: I understand that if the procedure is successful the results will be permanent and it will therefore be impossible for me to inseminate, conceive, or bear children.  I also understand that the procedure is intended to result in sterility, although the result has not been guaranteed.   11. I acknowledge that my physician has explained sedation/analgesia administration to me including the risk and benefits I consent to the administration of sedation/analgesia as may be necessary or desirable in the judgment of my physician.    I CERTIFY THAT I HAVE READ AND FULLY UNDERSTAND THE ABOVE CONSENT TO OPERATION and/or OTHER PROCEDURE.     ____________________________________  _________________________________        ______________________________  Signature of Patient    Signature of Responsible Person                Printed Name of Responsible Person                                      ____________________________________  _____________________________                ________________________________  Signature of Witness        Date  Time         Relationship to Patient    STATEMENT OF PHYSICIAN My signature below affirms that prior to the time of the procedure; I have explained to the patient and/or his/her legal representative, the risks and benefits involved in the proposed treatment and any reasonable alternative to the proposed treatment. I have also explained the risks and benefits involved in refusal of the proposed treatment and alternatives to the proposed treatment and have  answered the patient's questions. If I have a significant financial interest in a co-management agreement or a significant financial interest in any product or implant, or other significant relationship used in this procedure/surgery, I have disclosed this and had a discussion with my patient.     _____________________________________________________              _____________________________  (Signature of Physician)                                                                                         (Date)                                   (Time)  Patient Name: Cesia SHANNON Teresa      : 3/15/1965      Printed: 2025     Medical Record #: [id number]                                      Page 1 of 1   Protopic Counseling: Patient may experience a mild burning sensation during topical application. Protopic is not approved in children less than 2 years of age. There have been case reports of hematologic and skin malignancies in patients using topical calcineurin inhibitors although causality is questionable.

## 2022-12-27 ENCOUNTER — TELEPHONE (OUTPATIENT)
Dept: OBGYN CLINIC | Facility: CLINIC | Age: 57
End: 2022-12-27

## 2022-12-27 DIAGNOSIS — Z12.31 SCREENING MAMMOGRAM FOR BREAST CANCER: Primary | ICD-10-CM

## 2022-12-27 NOTE — TELEPHONE ENCOUNTER
RN spoke with pt and told her that mammo order was placed. CS number provided. Pt verbalized understanding and agreed with POC.

## 2022-12-27 NOTE — TELEPHONE ENCOUNTER
Patient is calling requesting a referral request for a mammogram referral. Please follow up with patient.

## 2023-01-28 ENCOUNTER — HOSPITAL ENCOUNTER (OUTPATIENT)
Dept: MAMMOGRAPHY | Facility: HOSPITAL | Age: 58
Discharge: HOME | End: 2023-01-28
Attending: OBSTETRICS & GYNECOLOGY
Payer: COMMERCIAL

## 2023-01-28 DIAGNOSIS — Z12.31 SCREENING MAMMOGRAM FOR BREAST CANCER: ICD-10-CM

## 2023-01-28 PROCEDURE — 77067 SCR MAMMO BI INCL CAD: CPT | Performed by: OBSTETRICS & GYNECOLOGY

## 2023-01-28 PROCEDURE — 77063 BREAST TOMOSYNTHESIS BI: CPT | Performed by: OBSTETRICS & GYNECOLOGY

## 2023-04-07 ENCOUNTER — LAB ENCOUNTER (OUTPATIENT)
Dept: LAB | Age: 58
End: 2023-04-07
Attending: INTERNAL MEDICINE
Payer: COMMERCIAL

## 2023-04-07 ENCOUNTER — OFFICE VISIT (OUTPATIENT)
Dept: INTERNAL MEDICINE CLINIC | Facility: CLINIC | Age: 58
End: 2023-04-07

## 2023-04-07 VITALS
OXYGEN SATURATION: 97 % | DIASTOLIC BLOOD PRESSURE: 72 MMHG | WEIGHT: 173.5 LBS | BODY MASS INDEX: 32.76 KG/M2 | HEIGHT: 61 IN | SYSTOLIC BLOOD PRESSURE: 106 MMHG | HEART RATE: 72 BPM

## 2023-04-07 DIAGNOSIS — Z91.013 SEAFOOD ALLERGY: ICD-10-CM

## 2023-04-07 DIAGNOSIS — L40.9 PSORIASIS: ICD-10-CM

## 2023-04-07 DIAGNOSIS — Z00.00 PHYSICAL EXAM: Primary | ICD-10-CM

## 2023-04-07 DIAGNOSIS — R00.2 PALPITATIONS: ICD-10-CM

## 2023-04-07 DIAGNOSIS — Z00.00 PHYSICAL EXAM: ICD-10-CM

## 2023-04-07 PROBLEM — I49.9 ARRHYTHMIA: Status: ACTIVE | Noted: 2023-04-07

## 2023-04-07 PROBLEM — I49.1 PAC (PREMATURE ATRIAL CONTRACTION): Status: ACTIVE | Noted: 2023-04-07

## 2023-04-07 LAB
ALBUMIN SERPL-MCNC: 4 G/DL (ref 3.4–5)
ALBUMIN/GLOB SERPL: 1.1 {RATIO} (ref 1–2)
ALP LIVER SERPL-CCNC: 67 U/L
ALT SERPL-CCNC: 36 U/L
ANION GAP SERPL CALC-SCNC: 6 MMOL/L (ref 0–18)
AST SERPL-CCNC: 19 U/L (ref 15–37)
BASOPHILS # BLD AUTO: 0.06 X10(3) UL (ref 0–0.2)
BASOPHILS NFR BLD AUTO: 0.9 %
BILIRUB SERPL-MCNC: 0.9 MG/DL (ref 0.1–2)
BUN BLD-MCNC: 13 MG/DL (ref 7–18)
BUN/CREAT SERPL: 16.7 (ref 10–20)
CALCIUM BLD-MCNC: 9.2 MG/DL (ref 8.5–10.1)
CHLORIDE SERPL-SCNC: 107 MMOL/L (ref 98–112)
CHOLEST SERPL-MCNC: 205 MG/DL (ref ?–200)
CO2 SERPL-SCNC: 28 MMOL/L (ref 21–32)
CREAT BLD-MCNC: 0.78 MG/DL
DEPRECATED RDW RBC AUTO: 42.7 FL (ref 35.1–46.3)
EOSINOPHIL # BLD AUTO: 0.2 X10(3) UL (ref 0–0.7)
EOSINOPHIL NFR BLD AUTO: 3.1 %
ERYTHROCYTE [DISTWIDTH] IN BLOOD BY AUTOMATED COUNT: 11.8 % (ref 11–15)
EST. AVERAGE GLUCOSE BLD GHB EST-MCNC: 105 MG/DL (ref 68–126)
FASTING PATIENT LIPID ANSWER: YES
FASTING STATUS PATIENT QL REPORTED: YES
GFR SERPLBLD BASED ON 1.73 SQ M-ARVRAT: 87 ML/MIN/1.73M2 (ref 60–?)
GLOBULIN PLAS-MCNC: 3.5 G/DL (ref 2.8–4.4)
GLUCOSE BLD-MCNC: 97 MG/DL (ref 70–99)
HBA1C MFR BLD: 5.3 % (ref ?–5.7)
HCT VFR BLD AUTO: 43.6 %
HDLC SERPL-MCNC: 62 MG/DL (ref 40–59)
HGB BLD-MCNC: 14.3 G/DL
IMM GRANULOCYTES # BLD AUTO: 0.02 X10(3) UL (ref 0–1)
IMM GRANULOCYTES NFR BLD: 0.3 %
LDLC SERPL CALC-MCNC: 118 MG/DL (ref ?–100)
LYMPHOCYTES # BLD AUTO: 2.14 X10(3) UL (ref 1–4)
LYMPHOCYTES NFR BLD AUTO: 33.4 %
MCH RBC QN AUTO: 32.2 PG (ref 26–34)
MCHC RBC AUTO-ENTMCNC: 32.8 G/DL (ref 31–37)
MCV RBC AUTO: 98.2 FL
MONOCYTES # BLD AUTO: 0.57 X10(3) UL (ref 0.1–1)
MONOCYTES NFR BLD AUTO: 8.9 %
NEUTROPHILS # BLD AUTO: 3.42 X10 (3) UL (ref 1.5–7.7)
NEUTROPHILS # BLD AUTO: 3.42 X10(3) UL (ref 1.5–7.7)
NEUTROPHILS NFR BLD AUTO: 53.4 %
NONHDLC SERPL-MCNC: 143 MG/DL (ref ?–130)
OSMOLALITY SERPL CALC.SUM OF ELEC: 292 MOSM/KG (ref 275–295)
PLATELET # BLD AUTO: 183 10(3)UL (ref 150–450)
POTASSIUM SERPL-SCNC: 3.9 MMOL/L (ref 3.5–5.1)
PROT SERPL-MCNC: 7.5 G/DL (ref 6.4–8.2)
RBC # BLD AUTO: 4.44 X10(6)UL
SODIUM SERPL-SCNC: 141 MMOL/L (ref 136–145)
TRIGL SERPL-MCNC: 140 MG/DL (ref 30–149)
TSI SER-ACNC: 1.56 MIU/ML (ref 0.36–3.74)
VLDLC SERPL CALC-MCNC: 24 MG/DL (ref 0–30)
WBC # BLD AUTO: 6.4 X10(3) UL (ref 4–11)

## 2023-04-07 PROCEDURE — 3074F SYST BP LT 130 MM HG: CPT | Performed by: INTERNAL MEDICINE

## 2023-04-07 PROCEDURE — 80061 LIPID PANEL: CPT

## 2023-04-07 PROCEDURE — 85025 COMPLETE CBC W/AUTO DIFF WBC: CPT

## 2023-04-07 PROCEDURE — 83036 HEMOGLOBIN GLYCOSYLATED A1C: CPT

## 2023-04-07 PROCEDURE — 99386 PREV VISIT NEW AGE 40-64: CPT | Performed by: INTERNAL MEDICINE

## 2023-04-07 PROCEDURE — 84443 ASSAY THYROID STIM HORMONE: CPT

## 2023-04-07 PROCEDURE — 3078F DIAST BP <80 MM HG: CPT | Performed by: INTERNAL MEDICINE

## 2023-04-07 PROCEDURE — 3008F BODY MASS INDEX DOCD: CPT | Performed by: INTERNAL MEDICINE

## 2023-04-07 PROCEDURE — 80053 COMPREHEN METABOLIC PANEL: CPT

## 2023-04-07 PROCEDURE — 36415 COLL VENOUS BLD VENIPUNCTURE: CPT

## 2023-04-10 ENCOUNTER — TELEPHONE (OUTPATIENT)
Dept: INTERNAL MEDICINE CLINIC | Facility: CLINIC | Age: 58
End: 2023-04-10

## 2023-04-10 NOTE — TELEPHONE ENCOUNTER
Patient called and relayed Dr Reyna Ames message. Patient verbalized understanding with no further questions noted.

## 2023-04-10 NOTE — TELEPHONE ENCOUNTER
----- Message from Ashanti Duke MD sent at 4/10/2023  9:01 AM CDT -----  Lab results reviewed. Cholesterol is 205 with LDL of 118--goal LDL of less than 130. HDL is very good at 62--at goal HDL of greater than 40. Hemoglobin A1c is normal at 5.3--therefore no evidence of diabetes. CMP shows normal electrolytes, kidney function and liver function test.    Thyroid function test is normal.    CBC shows normal hemoglobin and normal white cell count. Therefore, overall, all labs look good. No intervention is needed.

## 2023-06-21 NOTE — PROGRESS NOTES
----- Message from [facility] sent at 2023  2:50 PM CDT -----  Regarding: inhaler  Contact: [phone]  Dr Sneha Hsu,  My internist gave me samples of Trelegy that has three drugs. I've tired both doses, 200 mcg and 100 mcg. Both work wonderful for me. Dr. Lajoyce Phoenix suggested that I go with the lower dosage 100 mcg. In my previous message you had your nurse call me and told me you were good with it. Would you be able to fax a prescription to my mail order [employer]? [facility] in [address] [place] [address] closed its door on 2023 and many offices were closed including my internist Dr. Lajoyce Phoenix. He is in the process of getting reestablished in a new location. As a patient, I'm in a bit of a bind. Please let me know if you plan to order it for me.   Sincerely,  Harmeet Levine   [facility] [place] [facility] - [facility]    Progress Note    Dorna Model Patient Status:  Inpatient    3/15/1965 MRN [id number]   Location [facility] 5SW/SE Attending Candy Marie MD   [facility]       Subjective:   Dorna Model is a(n)

## 2023-10-16 ENCOUNTER — TELEPHONE (OUTPATIENT)
Dept: INTERNAL MEDICINE CLINIC | Facility: CLINIC | Age: 58
End: 2023-10-16

## 2023-10-16 NOTE — TELEPHONE ENCOUNTER
Respiratory infection triage:    Fever:  []  No fever  [x]  Fever>100.4 - 101-103 Wednesday. Resolved within 24hrs    Cough:  [] Tight cough  [] Cough with exertion  [] Dry cough  [x] Sputum production, Color: yellow    Breathing:  [] Mild shortness of breath interfering with activity  [] Wheezing  [] Pain with deep breathing  [] Using inhaler    Other symptoms:  [] Sore throat  [] Difficulty swallowing  [x] Nasal drainage/congestion  [] Sinus congestion/pressure - reports PND  [] Ear pain  [] Body aches  [] Poor appetite  [] Loss of sense of smell   [] Loss of sense of taste  []Conjunctivitis? [] Any recent travel? [] Any sick contacts? [] Are you a healthcare worker? ADDITIONAL NOTES:  To Dr. Alison Mata to advise---    Spoke to pt. Reports sx onset Tuesday. Developed body aches and fever from 101-103 on Wednesday. Resolved within 24 hours. Voice is very hoarse. Reports history of similar symptoms progressing to bronchitis or pneumonia. Reports chest congestion mainly at night. Using saline nasal spray, flonase, and vicks vaporub. She did buy robitussin DM. Asking if ok to take this. Home covid test negative today. Pt hoping to be seen or looking for MD recommendations. She has noted improvement in symptoms in the past with codeine cough syrup and zpak. Takes care of grandchildren this afternoon starting at 2:30. OK to use save slot? Notified patient that we will route this message to the doctor and see what their recommendations would be. In the meantime, if anything worsens, they were advised to call back or seek emergent evaluation.

## 2023-10-16 NOTE — TELEPHONE ENCOUNTER
Patient is calling last week she developed a fever for 2 days between 101-103. Patient is now experiencing chest congestion mainly at night, voice is hoarse.     Patient doesn't have a covid test at home, she would like to be seen    Please call patient [phone]

## 2023-10-16 NOTE — TELEPHONE ENCOUNTER
Would recommend over-the-counter Robitussin-DM to help with cough and congestion. Patient can also use Tylenol to help with fevers and myalgias. She should push more fluids and get adequate rest.  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 If symptoms progress, patient should come to urgent care so that chest x-ray, COVID testing and lab work can be obtained.

## 2023-12-17 ENCOUNTER — HOSPITAL ENCOUNTER (OUTPATIENT)
Age: 58
Discharge: HOME | End: 2023-12-17
Payer: COMMERCIAL

## 2023-12-17 VITALS
OXYGEN SATURATION: 96 % | TEMPERATURE: 100 F | SYSTOLIC BLOOD PRESSURE: 147 MMHG | HEART RATE: 76 BPM | DIASTOLIC BLOOD PRESSURE: 74 MMHG | RESPIRATION RATE: 20 BRPM

## 2023-12-17 DIAGNOSIS — J06.9 VIRAL URI WITH COUGH: Primary | ICD-10-CM

## 2023-12-17 LAB
POCT INFLUENZA A: NEGATIVE
POCT INFLUENZA B: NEGATIVE
SARS-COV-2 RNA RESP QL NAA+PROBE: NOT DETECTED

## 2023-12-17 PROCEDURE — 99203 OFFICE O/P NEW LOW 30 MIN: CPT

## 2023-12-17 PROCEDURE — 99213 OFFICE O/P EST LOW 20 MIN: CPT

## 2023-12-17 PROCEDURE — 87502 INFLUENZA DNA AMP PROBE: CPT | Performed by: PHYSICIAN ASSISTANT

## 2023-12-17 RX ORDER — BENZONATATE 100 MG/1
100 CAPSULE ORAL 3 TIMES DAILY PRN
Qty: 21 CAPSULE | Refills: 0 | Status: SHIPPED | OUTPATIENT
Start: 2023-12-17 | End: 2023-12-24

## 2023-12-17 RX ORDER — ALBUTEROL SULFATE 90 UG/1
2 AEROSOL, METERED RESPIRATORY (INHALATION) EVERY 4 HOURS PRN
Qty: 1 EACH | Refills: 0 | Status: SHIPPED | OUTPATIENT
Start: 2023-12-17 | End: 2024-01-16

## 2023-12-28 ENCOUNTER — OFFICE VISIT (OUTPATIENT)
Dept: INTERNAL MEDICINE CLINIC | Facility: CLINIC | Age: 58
End: 2023-12-28

## 2023-12-28 VITALS
TEMPERATURE: 98 F | BODY MASS INDEX: 30.78 KG/M2 | DIASTOLIC BLOOD PRESSURE: 75 MMHG | OXYGEN SATURATION: 97 % | WEIGHT: 163 LBS | SYSTOLIC BLOOD PRESSURE: 104 MMHG | HEIGHT: 61 IN | HEART RATE: 63 BPM

## 2023-12-28 DIAGNOSIS — R05.1 ACUTE COUGH: Primary | ICD-10-CM

## 2023-12-28 PROCEDURE — 99213 OFFICE O/P EST LOW 20 MIN: CPT | Performed by: INTERNAL MEDICINE

## 2023-12-28 PROCEDURE — 3008F BODY MASS INDEX DOCD: CPT | Performed by: INTERNAL MEDICINE

## 2023-12-28 PROCEDURE — 3078F DIAST BP <80 MM HG: CPT | Performed by: INTERNAL MEDICINE

## 2023-12-28 PROCEDURE — 3074F SYST BP LT 130 MM HG: CPT | Performed by: INTERNAL MEDICINE

## 2023-12-28 RX ORDER — CODEINE PHOSPHATE AND GUAIFENESIN 10; 100 MG/5ML; MG/5ML
5 SOLUTION ORAL 3 TIMES DAILY PRN
Qty: 100 ML | Refills: 0 | Status: SHIPPED | OUTPATIENT
Start: 2023-12-28

## 2023-12-28 RX ORDER — CLINDAMYCIN PHOSPHATE 10 MG/G
GEL TOPICAL
COMMUNITY
Start: 2023-12-21

## 2023-12-28 RX ORDER — AZITHROMYCIN 250 MG/1
TABLET, FILM COATED ORAL
Qty: 6 TABLET | Refills: 0 | Status: SHIPPED | OUTPATIENT
Start: 2023-12-28 | End: 2024-01-01

## 2023-12-28 NOTE — PROGRESS NOTES
Subjective:     Patient ID: Dusty Zayas is a 60year old female. HPI    History/Other:   She came in today to establish care and also following from    She went to [facility] on  because of upper respiratory symptoms and cough. They did COVID and flu was negative. She was discharged home. They gave her Tessalon Perles. She states that she got worse after start taking Tessalon Perles. She stopped taking. She continues to have a cough and congestion. Review of Systems   Constitutional: Negative. HENT:  Positive for congestion. Eyes: Negative. Respiratory:  Positive for cough. Cardiovascular: Negative. Gastrointestinal: Negative. Genitourinary: Negative. Musculoskeletal: Negative. Skin: Negative. Neurological:  Positive for weakness. Psychiatric/Behavioral: Negative. Current Outpatient Medications   Medication Sig Dispense Refill    Clindamycin Phosphate 1 % External Gel APPLY TOPICALLY TO THE AFFECTED AREA TWICE DAILY UNTIL GONE      tretinoin 0.025 % External Cream Apply 1 Application topically nightly. albuterol 108 (90 Base) MCG/ACT Inhalation Aero Soln Inhale 2 puffs into the lungs every 4 (four) hours as needed for Wheezing. 1 each 0    Metoprolol Succinate ER 25 MG Oral Tablet 24 Hr   3     Allergies: Allergies   Allergen Reactions    Fish Oil ANAPHYLAXIS    Seafood ANAPHYLAXIS    Vancomycin SWELLING     Swelling of the mouth, generalized itchiness and flushes.      Benzonatate Coughing       Past Medical History:   Diagnosis Date    Arrhythmia     \"palpitations\"    PAC (premature atrial contraction)     Psoriasis     Seafood allergy       Past Surgical History:   Procedure Laterality Date          x 3    INCISION AND DRAINAGE Right     rigth hand (bite by a cat)    TONSILLECTOMY      TUBAL LIGATION        Family History   Problem Relation Age of Onset    Diabetes Father     Stroke Mother     No Known Problems Brother     Heart Attack Paternal Grandmother 67    Diabetes Paternal Grandmother     No Known Problems Paternal Grandfather     Breast Cancer Neg     Ovarian Cancer Neg     Colon Cancer Neg       Social History:   Social History     Socioeconomic History    Marital status:    Occupational History    Occupation: retired stay at home [employer]     Comment: previously worked as    Tobacco Use    Smoking status: Former     Types: Cigarettes     Quit date:      Years since quittin.0    Smokeless tobacco: Never    Tobacco comments:     over 30 years ago   Vaping Use    Vaping Use: Never used   Substance and Sexual Activity    Alcohol use: Yes     Alcohol/week: 2.0 standard drinks of alcohol     Types: 2 Glasses of wine per week     Comment: Wine    Drug use: No    Sexual activity: Yes     Partners: Male   Other Topics Concern    Blood Transfusions No   Social History Narrative    Live with spouse     Feel safe        Objective:   Physical Exam  Vitals and nursing note reviewed. Constitutional:       Appearance: Normal appearance. HENT:      Head: Normocephalic and atraumatic. Cardiovascular:      Rate and Rhythm: Normal rate and regular rhythm. Pulses: Normal pulses. Pulmonary:      Effort: Pulmonary effort is normal.      Comments: Coarse breath sounds  Abdominal:      Palpations: Abdomen is soft. Musculoskeletal:      Cervical back: Normal range of motion and neck supple. Neurological:      Mental Status: She is alert. Mental status is at baseline. Assessment & Plan:   No diagnosis found. Cough/start  azithromycin,take with food,cheratusin with codein, if not better follow up   No orders of the defined types were placed in this encounter.       Meds This Visit:  Requested Prescriptions      No prescriptions requested or ordered in this encounter       Imaging & Referrals:  None

## 2024-02-27 ENCOUNTER — OFFICE VISIT (OUTPATIENT)
Dept: OBGYN CLINIC | Facility: CLINIC | Age: 59
End: 2024-02-27
Payer: COMMERCIAL

## 2024-02-27 VITALS
DIASTOLIC BLOOD PRESSURE: 77 MMHG | SYSTOLIC BLOOD PRESSURE: 143 MMHG | WEIGHT: 163 LBS | BODY MASS INDEX: 30.78 KG/M2 | HEIGHT: 61 IN

## 2024-02-27 DIAGNOSIS — Z01.419 ENCOUNTER FOR GYNECOLOGICAL EXAMINATION WITHOUT ABNORMAL FINDING: Primary | ICD-10-CM

## 2024-02-27 DIAGNOSIS — Z12.4 SCREENING FOR CERVICAL CANCER: ICD-10-CM

## 2024-02-27 DIAGNOSIS — Z12.31 SCREENING MAMMOGRAM FOR BREAST CANCER: ICD-10-CM

## 2024-02-27 PROCEDURE — 3008F BODY MASS INDEX DOCD: CPT | Performed by: OBSTETRICS & GYNECOLOGY

## 2024-02-27 PROCEDURE — 99396 PREV VISIT EST AGE 40-64: CPT | Performed by: OBSTETRICS & GYNECOLOGY

## 2024-02-27 PROCEDURE — 3078F DIAST BP <80 MM HG: CPT | Performed by: OBSTETRICS & GYNECOLOGY

## 2024-02-27 PROCEDURE — 3077F SYST BP >= 140 MM HG: CPT | Performed by: OBSTETRICS & GYNECOLOGY

## 2024-02-27 PROCEDURE — 87624 HPV HI-RISK TYP POOLED RSLT: CPT | Performed by: OBSTETRICS & GYNECOLOGY

## 2024-02-27 NOTE — PROGRESS NOTES
GYN H&P     2024  11:38 AM    CC: Patient is here for annual.     HPI: Patient is a 58 year old  for annual.   No vaginal bleeding.  Patient's last menstrual period was 2014 (approximate).    Plans to do cologard thru PCP    OB History    Para Term  AB Living   3 3 3     3   SAB IAB Ectopic Multiple Live Births           3      # Outcome Date GA Lbr Willy/2nd Weight Sex Delivery Anes PTL Lv   3 Term      Caesarean   REBA   2 Term      Caesarean   REBA   1 Term      Caesarean   REBA       GYN hx:    Hx Prior Abnormal Pap: No  Pap Date: 22  Pap Result Notes: WNL  Follow Up Recommendation: mammo done 2023 wnl      Past Medical History:   Diagnosis Date    Arrhythmia     \"palpitations\"    PAC (premature atrial contraction)     Psoriasis     Seafood allergy      Past Surgical History:   Procedure Laterality Date          x 3    INCISION AND DRAINAGE Right     rigth hand (bite by a cat)    TONSILLECTOMY      TUBAL LIGATION       Allergies   Allergen Reactions    Fish Oil ANAPHYLAXIS    Seafood ANAPHYLAXIS    Vancomycin SWELLING     Swelling of the mouth, generalized itchiness and flushes.     Benzonatate Coughing     Family History   Problem Relation Age of Onset    Diabetes Father     Stroke Mother     No Known Problems Brother     Heart Attack Paternal Grandmother 72    Diabetes Paternal Grandmother     No Known Problems Paternal Grandfather     Breast Cancer Neg     Ovarian Cancer Neg     Colon Cancer Neg      Social History     Socioeconomic History    Marital status:    Occupational History    Occupation: retired stay at home [employer]     Comment: previously worked as    Tobacco Use    Smoking status: Former     Types: Cigarettes     Quit date:      Years since quittin.1    Smokeless tobacco: Never    Tobacco comments:     over 30 years ago   Vaping Use    Vaping Use: Never used   Substance and Sexual Activity    Alcohol use: Yes      Alcohol/week: 2.0 standard drinks of alcohol     Types: 2 Glasses of wine per week     Comment: Wine    Drug use: No    Sexual activity: Yes     Partners: Male     Social History     Social History Narrative    Live with spouse     Feel safe       Medications reviewed. See active list.     /77   Ht 61\"   Wt 163 lb (73.9 kg)   LMP 03/01/2014 (Approximate)   BMI 30.80 kg/m²       Exam:   GENERAL: well developed, well nourished, in no apparent distress  SKIN: no rashes, no suspicious lesions  HEENT: normal  NECK: supple; no thyroidmegaly, no adenopathy  BREASTS: symmetrical, nontender, no palpable masses or nodes, no nipple discharge, no skin changes, no dippling, no palpable axillary adenopathy  ABDOMEN: Soft, non distended; non tender, no masses.  Liver and spleen non-tender, no enlargement. No palpable hernias  GYNE/:  External Genitalia: Normal appearing, no lesions, normal hair distribution   Urethral meatus appear wnl, no abnormal discharge or lesions noted.   Bladder: well supported, urethra wnl, no palpable tenderness or masses, no discharge  Vagina: normal pink mucosa, no lesions, normal clear discharge.   Uterus: midline, mobile, non-tender, firm and smooth  Cervix: pink, no lesions grossly visible, no discharge  Adnexa: non tender, no palpable masses, normal size  Anus:  No lesions or visible hemorrhoids        A/P: Patient is 58 year old female     1. Screening mammogram for breast cancer  - [facility] 2D+3D SCREENING BILAT (CPT=77067/85674); Future    2. Encounter for gynecological examination without abnormal finding      Radha Arias MD

## 2024-02-28 LAB — HPV I/H RISK 1 DNA SPEC QL NAA+PROBE: NEGATIVE

## 2024-03-08 ENCOUNTER — HOSPITAL ENCOUNTER (OUTPATIENT)
Dept: MAMMOGRAPHY | Facility: HOSPITAL | Age: 59
Discharge: HOME | End: 2024-03-08
Attending: OBSTETRICS & GYNECOLOGY
Payer: COMMERCIAL

## 2024-03-08 DIAGNOSIS — Z12.31 SCREENING MAMMOGRAM FOR BREAST CANCER: ICD-10-CM

## 2024-03-08 PROCEDURE — 77067 SCR MAMMO BI INCL CAD: CPT | Performed by: OBSTETRICS & GYNECOLOGY

## 2024-03-08 PROCEDURE — 77063 BREAST TOMOSYNTHESIS BI: CPT | Performed by: OBSTETRICS & GYNECOLOGY

## 2024-11-09 ENCOUNTER — OFFICE VISIT (OUTPATIENT)
Dept: INTERNAL MEDICINE CLINIC | Facility: CLINIC | Age: 59
End: 2024-11-09

## 2024-11-09 VITALS
HEIGHT: 61 IN | OXYGEN SATURATION: 98 % | SYSTOLIC BLOOD PRESSURE: 121 MMHG | HEART RATE: 59 BPM | TEMPERATURE: 98 F | WEIGHT: 163 LBS | DIASTOLIC BLOOD PRESSURE: 65 MMHG | BODY MASS INDEX: 30.78 KG/M2

## 2024-11-09 DIAGNOSIS — Z12.11 SCREENING FOR COLON CANCER: ICD-10-CM

## 2024-11-09 DIAGNOSIS — Z78.0 MENOPAUSE: ICD-10-CM

## 2024-11-09 DIAGNOSIS — E55.9 VITAMIN D DEFICIENCY: ICD-10-CM

## 2024-11-09 DIAGNOSIS — Z00.00 ANNUAL PHYSICAL EXAM: Primary | ICD-10-CM

## 2024-11-09 PROBLEM — R00.2 PALPITATIONS: Status: ACTIVE | Noted: 2019-11-26

## 2024-11-09 PROBLEM — E78.5 DYSLIPIDEMIA: Status: ACTIVE | Noted: 2019-11-26

## 2024-11-09 PROBLEM — R09.89 CAROTID BRUIT: Status: ACTIVE | Noted: 2021-11-19

## 2024-11-09 PROBLEM — R94.31 ABNORMAL ELECTROCARDIOGRAM (ECG) (EKG): Status: ACTIVE | Noted: 2019-11-26

## 2024-11-09 LAB
ALBUMIN SERPL-MCNC: 4.7 G/DL (ref 3.2–4.8)
ALBUMIN/GLOB SERPL: 1.9 {RATIO} (ref 1–2)
ALP LIVER SERPL-CCNC: 62 U/L
ALT SERPL-CCNC: 20 U/L
ANION GAP SERPL CALC-SCNC: 5 MMOL/L (ref 0–18)
AST SERPL-CCNC: 18 U/L (ref ?–34)
BASOPHILS # BLD AUTO: 0.05 X10(3) UL (ref 0–0.2)
BASOPHILS NFR BLD AUTO: 0.9 %
BILIRUB SERPL-MCNC: 1.2 MG/DL (ref 0.2–1.1)
BUN BLD-MCNC: 13 MG/DL (ref 9–23)
BUN/CREAT SERPL: 15.5 (ref 10–20)
CALCIUM BLD-MCNC: 9.9 MG/DL (ref 8.7–10.4)
CHLORIDE SERPL-SCNC: 110 MMOL/L (ref 98–112)
CHOLEST SERPL-MCNC: 189 MG/DL (ref ?–200)
CO2 SERPL-SCNC: 30 MMOL/L (ref 21–32)
CREAT BLD-MCNC: 0.84 MG/DL
DEPRECATED RDW RBC AUTO: 43.8 FL (ref 35.1–46.3)
EGFRCR SERPLBLD CKD-EPI 2021: 80 ML/MIN/1.73M2 (ref 60–?)
EOSINOPHIL # BLD AUTO: 0.15 X10(3) UL (ref 0–0.7)
EOSINOPHIL NFR BLD AUTO: 2.8 %
ERYTHROCYTE [DISTWIDTH] IN BLOOD BY AUTOMATED COUNT: 11.9 % (ref 11–15)
EST. AVERAGE GLUCOSE BLD GHB EST-MCNC: 108 MG/DL (ref 68–126)
FASTING PATIENT LIPID ANSWER: YES
FASTING STATUS PATIENT QL REPORTED: YES
GLOBULIN PLAS-MCNC: 2.5 G/DL (ref 2–3.5)
GLUCOSE BLD-MCNC: 83 MG/DL (ref 70–99)
HBA1C MFR BLD: 5.4 % (ref ?–5.7)
HCT VFR BLD AUTO: 41.6 %
HDLC SERPL-MCNC: 53 MG/DL (ref 40–59)
HGB BLD-MCNC: 14.2 G/DL
IMM GRANULOCYTES # BLD AUTO: 0.01 X10(3) UL (ref 0–1)
IMM GRANULOCYTES NFR BLD: 0.2 %
LDLC SERPL CALC-MCNC: 126 MG/DL (ref ?–100)
LYMPHOCYTES # BLD AUTO: 1.64 X10(3) UL (ref 1–4)
LYMPHOCYTES NFR BLD AUTO: 31.1 %
MCH RBC QN AUTO: 34.1 PG (ref 26–34)
MCHC RBC AUTO-ENTMCNC: 34.1 G/DL (ref 31–37)
MCV RBC AUTO: 99.8 FL
MONOCYTES # BLD AUTO: 0.51 X10(3) UL (ref 0.1–1)
MONOCYTES NFR BLD AUTO: 9.7 %
NEUTROPHILS # BLD AUTO: 2.91 X10 (3) UL (ref 1.5–7.7)
NEUTROPHILS # BLD AUTO: 2.91 X10(3) UL (ref 1.5–7.7)
NEUTROPHILS NFR BLD AUTO: 55.3 %
NONHDLC SERPL-MCNC: 136 MG/DL (ref ?–130)
OSMOLALITY SERPL CALC.SUM OF ELEC: 299 MOSM/KG (ref 275–295)
PLATELET # BLD AUTO: 159 10(3)UL (ref 150–450)
POTASSIUM SERPL-SCNC: 3.8 MMOL/L (ref 3.5–5.1)
PROT SERPL-MCNC: 7.2 G/DL (ref 5.7–8.2)
RBC # BLD AUTO: 4.17 X10(6)UL
SODIUM SERPL-SCNC: 145 MMOL/L (ref 136–145)
TRIGL SERPL-MCNC: 53 MG/DL (ref 30–149)
TSI SER-ACNC: 1.02 UIU/ML (ref 0.55–4.78)
VIT D+METAB SERPL-MCNC: 19.2 NG/ML (ref 30–100)
VLDLC SERPL CALC-MCNC: 9 MG/DL (ref 0–30)
WBC # BLD AUTO: 5.3 X10(3) UL (ref 4–11)

## 2024-11-09 PROCEDURE — 80061 LIPID PANEL: CPT | Performed by: INTERNAL MEDICINE

## 2024-11-09 PROCEDURE — 80053 COMPREHEN METABOLIC PANEL: CPT | Performed by: INTERNAL MEDICINE

## 2024-11-09 PROCEDURE — 85025 COMPLETE CBC W/AUTO DIFF WBC: CPT | Performed by: INTERNAL MEDICINE

## 2024-11-09 PROCEDURE — 84443 ASSAY THYROID STIM HORMONE: CPT | Performed by: INTERNAL MEDICINE

## 2024-11-09 PROCEDURE — 82306 VITAMIN D 25 HYDROXY: CPT | Performed by: INTERNAL MEDICINE

## 2024-11-09 PROCEDURE — 83036 HEMOGLOBIN GLYCOSYLATED A1C: CPT | Performed by: INTERNAL MEDICINE

## 2024-11-09 NOTE — PROGRESS NOTES
HPI:   Cesia Moore is a 59 year old female who presents for a complete physical exam. She denies any complains    Wt Readings from Last 3 Encounters:   24 163 lb (73.9 kg)   24 163 lb (73.9 kg)   23 163 lb (73.9 kg)     Body mass index is 30.8 kg/m².     Cholesterol, Total (mg/dL)   Date Value   2023 205 (H)   2019 210 (H)     HDL Cholesterol (mg/dL)   Date Value   2023 62 (H)   2019 57     LDL Cholesterol (mg/dL)   Date Value   2023 118 (H)   2019 127 (H)     AST (U/L)   Date Value   2023 19     ALT (U/L)   Date Value   2023 36        Current Outpatient Medications   Medication Sig Dispense Refill    Metoprolol Succinate ER 25 MG Oral Tablet 24 Hr   3      Past Medical History:    Arrhythmia    \"palpitations\"    PAC (premature atrial contraction)    Psoriasis    Seafood allergy      Past Surgical History:   Procedure Laterality Date          x 3    Incision and drainage Right     rigth hand (bite by a cat)    Tonsillectomy      Tubal ligation        Family History   Problem Relation Age of Onset    Diabetes Father     Stroke Mother     No Known Problems Brother     Heart Attack Paternal Grandmother 72    Diabetes Paternal Grandmother     No Known Problems Paternal Grandfather     Breast Cancer Neg     Ovarian Cancer Neg     Colon Cancer Neg       Social History:   Social History     Socioeconomic History    Marital status:    Occupational History    Occupation: retired stay at home [employer]     Comment: previously worked as    Tobacco Use    Smoking status: Former     Current packs/day: 0.00     Types: Cigarettes     Quit date:      Years since quittin.8    Smokeless tobacco: Never    Tobacco comments:     over 30 years ago   Vaping Use    Vaping status: Never Used   Substance and Sexual Activity    Alcohol use: Yes     Alcohol/week: 2.0 standard drinks of alcohol     Types: 2 Glasses of wine per week      Comment: Wine, weekly    Drug use: No    Sexual activity: Yes     Partners: Male   Other Topics Concern    Blood Transfusions No   Social History Narrative    Live with spouse     Feel safe     Exercise: walking.  Diet: watches calories closely     REVIEW OF SYSTEMS:     Constitutional Negative Chills, fatigue and fever.   ENMT Negative Hearing loss, nasal drainage, pain in/around ear, sinus pressure and sore throat.   Eyes Negative Eye pain and vision changes.   Respiratory Negative Cough, dyspnea and wheezing.   Cardio Negative Chest pain, claudication, edema and irregular heartbeat/palpitations.   GI Negative Abdominal pain, blood in stool, constipation, diarrhea, heartburn, nausea and vomiting.    Negative Dysuria, hematuria, urinary incontinence. Menses regular, not heavy.   Endocrine Negative Cold intolerance and heat intolerance.   Neuro Negative Dizziness, extremity weakness, headache, memory impairment, numbness in extremities, seizures and tremors.   Psych Negative Anxiety, depression and insomnia.   Integumentary Negative Breast discharge, breast lump(s), hair loss and rash.   MS Negative Back pain and joint pain.   Hema/Lymph Negative Easy bleeding, easy bruising and thromboembolic events.   Allergic/Immuno Negative Environmental allergies, food allergies and seasonal allergies.         EXAM:   /65 (BP Location: Left arm, Patient Position: Sitting, Cuff Size: adult)   Pulse 59   Temp 98.1 °F (36.7 °C) (Temporal)   Ht 5' 1\" (1.549 m)   Wt 163 lb (73.9 kg)   LMP 03/01/2014 (Approximate)   SpO2 98%   BMI 30.80 kg/m²   Body mass index is 30.8 kg/m².     Constitutional Normal Well developed.   Eyes Normal Pupil - Right: Normal, Left: Normal.   Ears Normal External - normal. Canal. TM - Normal bilaterally  Hearing - grossly normal   Nasopharynx Normal External nose - Normal. Lips/teeth/gums - Normal. Oropharynx - clear no erythema or exudate   Neck Exam Normal Palpation - Normal. No thyromegaly  or lymphadenopathy   Breast Normal Inspection, palpation, nipples normal bilaterally. Self breast exam has been taught. Patient performs self breast exam.   Respiratory Normal Auscultation - Normal, no wheezes or rales   Cardiovascular Normal Regular rate and rhythm. No murmurs, gallops, or rubs   Vascular Normal Pulses - Dorsalis pedis: Normal. Bruits - Carotids: Absent.    Extremity Normal No edema. No varicosities.   Abdomen Normal Inspection normal, BS active. No abdominal tenderness or masses palpable   Musculoskeletal Normal Overview - Normal. No swelling or deformities.   Skin Normal Inspection - Normal.   Neurological Normal Memory - Normal. Sensory - Normal. Motor - Normal. Balance & gait - Normal.   Psychiatric Normal Orientation - Oriented to time, place, person & situation. Appropriate mood and affect.          ASSESSMENT AND PLAN:   Cesia Moore is a 59 year old female who presents for a complete physical exam.  Self breast exam explained.   Health maintenance: Patient is due for blood work   Arrhythmias she is on metoprolol currently stable  Screen for colon cancer FIT that she is refusing colonoscopy  Refusing flu shot,  Menopause bone density scan  Pt' s weight is Body mass index is 30.8 kg/m²., recommended low fat diet and aerobic exercise 30 minutes three times weekly.  The patient indicates understanding of these issues and agrees to the plan.  The patient is asked to return for annual physical in 1 year.

## 2025-02-05 ENCOUNTER — MED REC SCAN ONLY (OUTPATIENT)
Dept: INTERNAL MEDICINE CLINIC | Facility: CLINIC | Age: 60
End: 2025-02-05

## 2025-02-09 ENCOUNTER — HOSPITAL ENCOUNTER (INPATIENT)
Facility: HOSPITAL | Age: 60
LOS: 1 days | Discharge: HOME | End: 2025-02-10
Attending: EMERGENCY MEDICINE | Admitting: INTERNAL MEDICINE
Payer: COMMERCIAL

## 2025-02-09 ENCOUNTER — APPOINTMENT (OUTPATIENT)
Dept: CT IMAGING | Facility: HOSPITAL | Age: 60
End: 2025-02-09
Attending: EMERGENCY MEDICINE
Payer: COMMERCIAL

## 2025-02-09 ENCOUNTER — ANESTHESIA (OUTPATIENT)
Dept: SURGERY | Facility: HOSPITAL | Age: 60
End: 2025-02-09
Payer: COMMERCIAL

## 2025-02-09 ENCOUNTER — ANESTHESIA EVENT (OUTPATIENT)
Dept: SURGERY | Facility: HOSPITAL | Age: 60
End: 2025-02-09
Payer: COMMERCIAL

## 2025-02-09 DIAGNOSIS — K35.80 ACUTE APPENDICITIS: ICD-10-CM

## 2025-02-09 DIAGNOSIS — K35.30 ACUTE APPENDICITIS WITH LOCALIZED PERITONITIS, WITHOUT PERFORATION, ABSCESS, OR GANGRENE: Primary | ICD-10-CM

## 2025-02-09 LAB
ALBUMIN SERPL-MCNC: 4.8 G/DL (ref 3.2–4.8)
ALBUMIN/GLOB SERPL: 1.9 {RATIO} (ref 1–2)
ALP LIVER SERPL-CCNC: 74 U/L
ALT SERPL-CCNC: 18 U/L
ANION GAP SERPL CALC-SCNC: 7 MMOL/L (ref 0–18)
AST SERPL-CCNC: 16 U/L (ref ?–34)
BASOPHILS # BLD AUTO: 0.03 X10(3) UL (ref 0–0.2)
BASOPHILS NFR BLD AUTO: 0.3 %
BILIRUB SERPL-MCNC: 1.3 MG/DL (ref 0.2–1.1)
BILIRUB UR QL: NEGATIVE
BUN BLD-MCNC: 13 MG/DL (ref 9–23)
BUN/CREAT SERPL: 14.4 (ref 10–20)
CALCIUM BLD-MCNC: 9.2 MG/DL (ref 8.7–10.4)
CHLORIDE SERPL-SCNC: 104 MMOL/L (ref 98–112)
CLARITY UR: CLEAR
CO2 SERPL-SCNC: 28 MMOL/L (ref 21–32)
CREAT BLD-MCNC: 0.9 MG/DL
DEPRECATED RDW RBC AUTO: 47.4 FL (ref 35.1–46.3)
EGFRCR SERPLBLD CKD-EPI 2021: 73 ML/MIN/1.73M2 (ref 60–?)
EOSINOPHIL # BLD AUTO: 0.02 X10(3) UL (ref 0–0.7)
EOSINOPHIL NFR BLD AUTO: 0.2 %
ERYTHROCYTE [DISTWIDTH] IN BLOOD BY AUTOMATED COUNT: 12.8 % (ref 11–15)
GLOBULIN PLAS-MCNC: 2.5 G/DL (ref 2–3.5)
GLUCOSE BLD-MCNC: 129 MG/DL (ref 70–99)
GLUCOSE UR-MCNC: NORMAL MG/DL
HCT VFR BLD AUTO: 42.4 %
HGB BLD-MCNC: 14.1 G/DL
HGB UR QL STRIP.AUTO: NEGATIVE
IMM GRANULOCYTES # BLD AUTO: 0.03 X10(3) UL (ref 0–1)
IMM GRANULOCYTES NFR BLD: 0.3 %
KETONES UR-MCNC: NEGATIVE MG/DL
LEUKOCYTE ESTERASE UR QL STRIP.AUTO: NEGATIVE
LYMPHOCYTES # BLD AUTO: 1.3 X10(3) UL (ref 1–4)
LYMPHOCYTES NFR BLD AUTO: 11.6 %
MCH RBC QN AUTO: 33.1 PG (ref 26–34)
MCHC RBC AUTO-ENTMCNC: 33.3 G/DL (ref 31–37)
MCV RBC AUTO: 99.5 FL
MONOCYTES # BLD AUTO: 0.98 X10(3) UL (ref 0.1–1)
MONOCYTES NFR BLD AUTO: 8.7 %
NEUTROPHILS # BLD AUTO: 8.88 X10 (3) UL (ref 1.5–7.7)
NEUTROPHILS # BLD AUTO: 8.88 X10(3) UL (ref 1.5–7.7)
NEUTROPHILS NFR BLD AUTO: 78.9 %
NITRITE UR QL STRIP.AUTO: NEGATIVE
OSMOLALITY SERPL CALC.SUM OF ELEC: 290 MOSM/KG (ref 275–295)
PH UR: 7.5 [PH] (ref 5–8)
PLATELET # BLD AUTO: 206 10(3)UL (ref 150–450)
POTASSIUM SERPL-SCNC: 4 MMOL/L (ref 3.5–5.1)
PROT SERPL-MCNC: 7.3 G/DL (ref 5.7–8.2)
RBC # BLD AUTO: 4.26 X10(6)UL
SODIUM SERPL-SCNC: 139 MMOL/L (ref 136–145)
SP GR UR STRIP: 1.03 (ref 1–1.03)
UROBILINOGEN UR STRIP-ACNC: NORMAL
WBC # BLD AUTO: 11.2 X10(3) UL (ref 4–11)

## 2025-02-09 PROCEDURE — 99223 1ST HOSP IP/OBS HIGH 75: CPT | Performed by: INTERNAL MEDICINE

## 2025-02-09 PROCEDURE — 74177 CT ABD & PELVIS W/CONTRAST: CPT | Performed by: EMERGENCY MEDICINE

## 2025-02-09 PROCEDURE — 44970 LAPAROSCOPY APPENDECTOMY: CPT | Performed by: SURGERY

## 2025-02-09 PROCEDURE — 99222 1ST HOSP IP/OBS MODERATE 55: CPT | Performed by: SURGERY

## 2025-02-09 PROCEDURE — 0DTJ4ZZ RESECTION OF APPENDIX, PERCUTANEOUS ENDOSCOPIC APPROACH: ICD-10-PCS | Performed by: SURGERY

## 2025-02-09 RX ORDER — ONDANSETRON 2 MG/ML
INJECTION INTRAMUSCULAR; INTRAVENOUS AS NEEDED
Status: DISCONTINUED | OUTPATIENT
Start: 2025-02-09 | End: 2025-02-09 | Stop reason: SURG

## 2025-02-09 RX ORDER — ONDANSETRON 2 MG/ML
4 INJECTION INTRAMUSCULAR; INTRAVENOUS EVERY 6 HOURS PRN
Status: DISCONTINUED | OUTPATIENT
Start: 2025-02-09 | End: 2025-02-10

## 2025-02-09 RX ORDER — SODIUM CHLORIDE, SODIUM LACTATE, POTASSIUM CHLORIDE, CALCIUM CHLORIDE 600; 310; 30; 20 MG/100ML; MG/100ML; MG/100ML; MG/100ML
INJECTION, SOLUTION INTRAVENOUS CONTINUOUS
Status: DISCONTINUED | OUTPATIENT
Start: 2025-02-09 | End: 2025-02-09 | Stop reason: HOSPADM

## 2025-02-09 RX ORDER — HYDROMORPHONE HYDROCHLORIDE 1 MG/ML
0.4 INJECTION, SOLUTION INTRAMUSCULAR; INTRAVENOUS; SUBCUTANEOUS EVERY 5 MIN PRN
Status: DISCONTINUED | OUTPATIENT
Start: 2025-02-09 | End: 2025-02-09 | Stop reason: HOSPADM

## 2025-02-09 RX ORDER — HYDROMORPHONE HYDROCHLORIDE 1 MG/ML
0.6 INJECTION, SOLUTION INTRAMUSCULAR; INTRAVENOUS; SUBCUTANEOUS EVERY 5 MIN PRN
Status: DISCONTINUED | OUTPATIENT
Start: 2025-02-09 | End: 2025-02-09 | Stop reason: HOSPADM

## 2025-02-09 RX ORDER — ROCURONIUM BROMIDE 10 MG/ML
INJECTION, SOLUTION INTRAVENOUS AS NEEDED
Status: DISCONTINUED | OUTPATIENT
Start: 2025-02-09 | End: 2025-02-09 | Stop reason: SURG

## 2025-02-09 RX ORDER — MIDAZOLAM HYDROCHLORIDE 1 MG/ML
INJECTION INTRAMUSCULAR; INTRAVENOUS AS NEEDED
Status: DISCONTINUED | OUTPATIENT
Start: 2025-02-09 | End: 2025-02-09 | Stop reason: SURG

## 2025-02-09 RX ORDER — MORPHINE SULFATE 4 MG/ML
4 INJECTION, SOLUTION INTRAMUSCULAR; INTRAVENOUS EVERY 30 MIN PRN
Status: DISCONTINUED | OUTPATIENT
Start: 2025-02-09 | End: 2025-02-09 | Stop reason: HOSPADM

## 2025-02-09 RX ORDER — SENNOSIDES 8.6 MG
17.2 TABLET ORAL NIGHTLY PRN
Status: DISCONTINUED | OUTPATIENT
Start: 2025-02-09 | End: 2025-02-10

## 2025-02-09 RX ORDER — ACETAMINOPHEN 325 MG/1
650 TABLET ORAL EVERY 4 HOURS PRN
Status: DISCONTINUED | OUTPATIENT
Start: 2025-02-09 | End: 2025-02-10

## 2025-02-09 RX ORDER — ACETAMINOPHEN 500 MG
500 TABLET ORAL EVERY 4 HOURS PRN
Status: DISCONTINUED | OUTPATIENT
Start: 2025-02-09 | End: 2025-02-10

## 2025-02-09 RX ORDER — NALOXONE HYDROCHLORIDE 0.4 MG/ML
0.08 INJECTION, SOLUTION INTRAMUSCULAR; INTRAVENOUS; SUBCUTANEOUS AS NEEDED
Status: DISCONTINUED | OUTPATIENT
Start: 2025-02-09 | End: 2025-02-09 | Stop reason: HOSPADM

## 2025-02-09 RX ORDER — KETOROLAC TROMETHAMINE 15 MG/ML
15 INJECTION, SOLUTION INTRAMUSCULAR; INTRAVENOUS EVERY 6 HOURS PRN
Status: DISCONTINUED | OUTPATIENT
Start: 2025-02-09 | End: 2025-02-10

## 2025-02-09 RX ORDER — SODIUM PHOSPHATE, DIBASIC AND SODIUM PHOSPHATE, MONOBASIC 7; 19 G/230ML; G/230ML
1 ENEMA RECTAL ONCE AS NEEDED
Status: DISCONTINUED | OUTPATIENT
Start: 2025-02-09 | End: 2025-02-10

## 2025-02-09 RX ORDER — MORPHINE SULFATE 4 MG/ML
2 INJECTION, SOLUTION INTRAMUSCULAR; INTRAVENOUS EVERY 10 MIN PRN
Status: DISCONTINUED | OUTPATIENT
Start: 2025-02-09 | End: 2025-02-09 | Stop reason: HOSPADM

## 2025-02-09 RX ORDER — MORPHINE SULFATE 4 MG/ML
4 INJECTION, SOLUTION INTRAMUSCULAR; INTRAVENOUS EVERY 2 HOUR PRN
Status: DISCONTINUED | OUTPATIENT
Start: 2025-02-09 | End: 2025-02-10

## 2025-02-09 RX ORDER — DEXAMETHASONE SODIUM PHOSPHATE 4 MG/ML
VIAL (ML) INJECTION AS NEEDED
Status: DISCONTINUED | OUTPATIENT
Start: 2025-02-09 | End: 2025-02-09 | Stop reason: SURG

## 2025-02-09 RX ORDER — BISACODYL 10 MG
10 SUPPOSITORY, RECTAL RECTAL
Status: DISCONTINUED | OUTPATIENT
Start: 2025-02-09 | End: 2025-02-10

## 2025-02-09 RX ORDER — HEPARIN SODIUM 5000 [USP'U]/ML
5000 INJECTION, SOLUTION INTRAVENOUS; SUBCUTANEOUS EVERY 8 HOURS SCHEDULED
Status: DISCONTINUED | OUTPATIENT
Start: 2025-02-09 | End: 2025-02-10

## 2025-02-09 RX ORDER — METRONIDAZOLE 500 MG/100ML
500 INJECTION, SOLUTION INTRAVENOUS EVERY 12 HOURS
Status: DISCONTINUED | OUTPATIENT
Start: 2025-02-09 | End: 2025-02-10

## 2025-02-09 RX ORDER — HYDROCODONE BITARTRATE AND ACETAMINOPHEN 5; 325 MG/1; MG/1
1 TABLET ORAL EVERY 6 HOURS PRN
Status: DISCONTINUED | OUTPATIENT
Start: 2025-02-09 | End: 2025-02-09 | Stop reason: ALTCHOICE

## 2025-02-09 RX ORDER — DEXTROSE MONOHYDRATE AND SODIUM CHLORIDE 5; .45 G/100ML; G/100ML
INJECTION, SOLUTION INTRAVENOUS CONTINUOUS
Status: ACTIVE | OUTPATIENT
Start: 2025-02-09 | End: 2025-02-09

## 2025-02-09 RX ORDER — PHENYLEPHRINE HCL 10 MG/ML
VIAL (ML) INJECTION AS NEEDED
Status: DISCONTINUED | OUTPATIENT
Start: 2025-02-09 | End: 2025-02-09 | Stop reason: SURG

## 2025-02-09 RX ORDER — METRONIDAZOLE 500 MG/100ML
500 INJECTION, SOLUTION INTRAVENOUS ONCE
Status: DISCONTINUED | OUTPATIENT
Start: 2025-02-09 | End: 2025-02-09 | Stop reason: HOSPADM

## 2025-02-09 RX ORDER — MAGNESIUM HYDROXIDE 1200 MG/15ML
LIQUID ORAL CONTINUOUS PRN
Status: DISCONTINUED | OUTPATIENT
Start: 2025-02-09 | End: 2025-02-09 | Stop reason: HOSPADM

## 2025-02-09 RX ORDER — MORPHINE SULFATE 2 MG/ML
1 INJECTION, SOLUTION INTRAMUSCULAR; INTRAVENOUS EVERY 2 HOUR PRN
Status: DISCONTINUED | OUTPATIENT
Start: 2025-02-09 | End: 2025-02-10

## 2025-02-09 RX ORDER — SODIUM CHLORIDE 9 MG/ML
INJECTION, SOLUTION INTRAVENOUS CONTINUOUS
Status: DISCONTINUED | OUTPATIENT
Start: 2025-02-09 | End: 2025-02-10

## 2025-02-09 RX ORDER — HYDROCODONE BITARTRATE AND ACETAMINOPHEN 5; 325 MG/1; MG/1
2 TABLET ORAL EVERY 4 HOURS PRN
Status: DISCONTINUED | OUTPATIENT
Start: 2025-02-09 | End: 2025-02-10

## 2025-02-09 RX ORDER — MORPHINE SULFATE 4 MG/ML
4 INJECTION, SOLUTION INTRAMUSCULAR; INTRAVENOUS EVERY 10 MIN PRN
Status: DISCONTINUED | OUTPATIENT
Start: 2025-02-09 | End: 2025-02-09 | Stop reason: HOSPADM

## 2025-02-09 RX ORDER — HYDROCODONE BITARTRATE AND ACETAMINOPHEN 5; 325 MG/1; MG/1
1 TABLET ORAL EVERY 4 HOURS PRN
Status: DISCONTINUED | OUTPATIENT
Start: 2025-02-09 | End: 2025-02-10

## 2025-02-09 RX ORDER — MORPHINE SULFATE 10 MG/ML
6 INJECTION, SOLUTION INTRAMUSCULAR; INTRAVENOUS EVERY 10 MIN PRN
Status: DISCONTINUED | OUTPATIENT
Start: 2025-02-09 | End: 2025-02-09 | Stop reason: HOSPADM

## 2025-02-09 RX ORDER — PROCHLORPERAZINE EDISYLATE 5 MG/ML
5 INJECTION INTRAMUSCULAR; INTRAVENOUS EVERY 8 HOURS PRN
Status: DISCONTINUED | OUTPATIENT
Start: 2025-02-09 | End: 2025-02-10

## 2025-02-09 RX ORDER — MORPHINE SULFATE 4 MG/ML
4 INJECTION, SOLUTION INTRAMUSCULAR; INTRAVENOUS ONCE
Status: COMPLETED | OUTPATIENT
Start: 2025-02-09 | End: 2025-02-09

## 2025-02-09 RX ORDER — POLYETHYLENE GLYCOL 3350 17 G/17G
17 POWDER, FOR SOLUTION ORAL DAILY PRN
Status: DISCONTINUED | OUTPATIENT
Start: 2025-02-09 | End: 2025-02-10

## 2025-02-09 RX ORDER — BUPIVACAINE HYDROCHLORIDE AND EPINEPHRINE 2.5; 5 MG/ML; UG/ML
INJECTION, SOLUTION INFILTRATION; PERINEURAL AS NEEDED
Status: DISCONTINUED | OUTPATIENT
Start: 2025-02-09 | End: 2025-02-09 | Stop reason: HOSPADM

## 2025-02-09 RX ORDER — HYDROMORPHONE HYDROCHLORIDE 1 MG/ML
0.2 INJECTION, SOLUTION INTRAMUSCULAR; INTRAVENOUS; SUBCUTANEOUS EVERY 5 MIN PRN
Status: DISCONTINUED | OUTPATIENT
Start: 2025-02-09 | End: 2025-02-09 | Stop reason: HOSPADM

## 2025-02-09 RX ORDER — MORPHINE SULFATE 2 MG/ML
2 INJECTION, SOLUTION INTRAMUSCULAR; INTRAVENOUS EVERY 2 HOUR PRN
Status: DISCONTINUED | OUTPATIENT
Start: 2025-02-09 | End: 2025-02-10

## 2025-02-09 RX ADMIN — DEXAMETHASONE SODIUM PHOSPHATE 4 MG: 4 MG/ML VIAL (ML) INJECTION at 14:09:00

## 2025-02-09 RX ADMIN — MIDAZOLAM HYDROCHLORIDE 2 MG: 1 INJECTION INTRAMUSCULAR; INTRAVENOUS at 13:59:00

## 2025-02-09 RX ADMIN — ROCURONIUM BROMIDE 30 MG: 10 INJECTION, SOLUTION INTRAVENOUS at 14:05:00

## 2025-02-09 RX ADMIN — ONDANSETRON 4 MG: 2 INJECTION INTRAMUSCULAR; INTRAVENOUS at 14:09:00

## 2025-02-09 RX ADMIN — PHENYLEPHRINE HCL 100 MCG: 10 MG/ML VIAL (ML) INJECTION at 14:13:00

## 2025-02-09 NOTE — ED INITIAL ASSESSMENT (HPI)
Cesia arrived through triage with her  for c/o R lower abdominal pain and nausea, persistent since last night. No reported relief with Pepto or Gas-x.

## 2025-02-09 NOTE — ANESTHESIA PREPROCEDURE EVALUATION
Anesthesia PreOp Note    HPI:     Cesia Moore is a 59 year old female who presents for preoperative consultation requested by: Chong Jules MD    Date of Surgery: 2025    Procedure(s):  LAPAROSCOPIC POSSIBLE OPEN APPENDECTOMY  Indication: Acute appendicitis [K35.80]    Relevant Problems   No relevant active problems       NPO:  Last Liquid Consumption Date: 25  Last Liquid Consumption Time: 0730 (water with morning meds)  Last Solid Consumption Date: 25  Last Solid Consumption Time: 0730 (1/2 piece of toast)  Last Liquid Consumption Date: 25  NPO: Yes       History Review:  Patient Active Problem List    Diagnosis Date Noted    Acute appendicitis with localized peritonitis, without perforation, abscess, or gangrene 2025    PAC (premature atrial contraction) 2023    Psoriasis 2023    Arrhythmia 2023    Seafood allergy 2023    Carotid bruit 2021    Abnormal electrocardiogram (ECG) (EKG) 2019    Dyslipidemia 2019    Palpitations 2019    Colonoscopy refused 2019       Past Medical History:    Arrhythmia    \"palpitations\"    PAC (premature atrial contraction)    Psoriasis    Seafood allergy       Past Surgical History:   Procedure Laterality Date          x 3    Incision and drainage Right     rigth hand (bite by a cat)    Tonsillectomy      Tubal ligation         Prescriptions Prior to Admission[1]  Current Medications and Prescriptions Ordered in Epic[2]    Allergies[3]    Family History   Problem Relation Age of Onset    Diabetes Father     Stroke Mother     No Known Problems Brother     Heart Attack Paternal Grandmother 72    Diabetes Paternal Grandmother     No Known Problems Paternal Grandfather     Breast Cancer Neg     Ovarian Cancer Neg     Colon Cancer Neg      Social History     Socioeconomic History    Marital status:    Occupational History    Occupation: retired stay at home [employer]     Comment: previously  worked as    Tobacco Use    Smoking status: Former     Current packs/day: 0.00     Types: Cigarettes     Quit date:      Years since quittin.1    Smokeless tobacco: Never    Tobacco comments:     over 30 years ago   Vaping Use    Vaping status: Never Used   Substance and Sexual Activity    Alcohol use: Yes     Alcohol/week: 2.0 standard drinks of alcohol     Types: 2 Glasses of wine per week     Comment: Wine, weekly    Drug use: No    Sexual activity: Yes     Partners: Male   Other Topics Concern    Blood Transfusions No       Available pre-op labs reviewed.  Lab Results   Component Value Date    WBC 11.2 (H) 2025    RBC 4.26 2025    HGB 14.1 2025    HCT 42.4 2025    MCV 99.5 2025    MCH 33.1 2025    MCHC 33.3 2025    RDW 12.8 2025    .0 2025     Lab Results   Component Value Date     2025    K 4.0 2025     2025    CO2 28.0 2025    BUN 13 2025    CREATSERUM 0.90 2025     (H) 2025    CA 9.2 2025          Vital Signs:  Body mass index is 30.23 kg/m².   height is 1.549 m (5' 1\") and weight is 72.6 kg (160 lb). Her temporal temperature is 100.3 °F (37.9 °C). Her blood pressure is 134/68 and her pulse is 76. Her respiration is 16 and oxygen saturation is 96%.   Vitals:    25 0911 25 1200   BP: 120/77 134/68   Pulse: 82 76   Resp: 16 16   Temp: 100.3 °F (37.9 °C)    TempSrc: Temporal    SpO2: 97% 96%   Weight: 72.6 kg (160 lb)    Height: 1.549 m (5' 1\")         Anesthesia Evaluation      Airway   Mallampati: III  TM distance: <3 FB  Neck ROM: full  Dental - Dentition appears grossly intact     Pulmonary - normal exam   (-) asthma  Cardiovascular - normal exam  Exercise tolerance: good  (-) hypertension    Neuro/Psych    (-) CVA    GI/Hepatic/Renal    (-) GERD    Endo/Other    (-) diabetes mellitus  Abdominal   (+) obese                 Anesthesia Plan:   ASA:   2  Emergent    Plan:   General  Airway:  ETT  Plan Comments: RSI  Informed Consent Plan and Risks Discussed With:  Patient  Consent Comment: I have discussed the anesthetic plan, major risks and alternatives with the patient and answered all questions.  Minor and major side effects were discussed with patient, including but not limited to: injury to teeth/gums/lips, aspiration, nausea/vomiting postoperatively, anaphylaxis, heart attack, stroke, post-operative ventilation and death. The patient desires to proceed with surgery and anesthesia as planned. All questions answered.        I have informed Cesia Moore and/or legal guardian or family member of the nature of the anesthetic plan, benefits, risks including possible dental damage if relevant, major complications, and any alternative forms of anesthetic management.   All of the patient's questions were answered to the best of my ability. The patient desires the anesthetic management as planned.  Gonzalo Clayton DO  2/9/2025 1:04 PM  Present on Admission:  **None**           [1]   Medications Prior to Admission   Medication Sig Dispense Refill Last Dose/Taking    Metoprolol Succinate ER 25 MG Oral Tablet 24 Hr Take 1 tablet (25 mg total) by mouth daily.  3 2/9/2025 Morning   [2]   Current Facility-Administered Medications Ordered in Epic   Medication Dose Route Frequency Provider Last Rate Last Admin    metroNIDAZOLE in sodium chloride 0.79% (Flagyl) 5 mg/mL IVPB premix 500 mg  500 mg Intravenous Once Hai Hooper MD         No current [facility]-ordered outpatient medications on file.   [3]   Allergies  Allergen Reactions    Fish Oil ANAPHYLAXIS    Seafood ANAPHYLAXIS    Vancomycin SWELLING     Swelling of the mouth, generalized itchiness and flushes.     Benzonatate Coughing

## 2025-02-09 NOTE — ANESTHESIA PROCEDURE NOTES
Airway  Date/Time: 2/9/2025 2:06 PM  Urgency: Elective      General Information and Staff    Patient location during procedure: OR  Anesthesiologist: Gonzalo Clayton DO  Performed: anesthesiologist   Performed by: Gonzalo Clayton DO  Authorized by: Gonzalo Clayton DO      Indications and Patient Condition  Indications for airway management: anesthesia  Sedation level: deep  Preoxygenated: yes  Patient position: sniffing  Mask difficulty assessment: 0 - not attempted    Final Airway Details  Final airway type: endotracheal airway      Successful airway: ETT  Cuffed: yes   Successful intubation technique: direct laryngoscopy  Endotracheal tube insertion site: oral  Blade: GlideScope  Blade size: #3  ETT size (mm): 7.0    Cormack-Lehane Classification: grade I - full view of glottis  Placement verified by: capnometry   Measured from: lips  ETT to lips (cm): 22  Number of attempts at approach: 1

## 2025-02-09 NOTE — ED PROVIDER NOTES
Patient Seen in: [facility] Emergency Department    History     Chief Complaint   Patient presents with    Abdomen/Flank Pain     Stated Complaint: Abdominal pain     HPI    59-year-old female without past medical history presenting with  for evaluation with lower abdominal pain since yesterday morning associated with decreased oral intake.  Symptoms ongoing despite acetaminophen, Pepto-Bismol, Gas-X.  No prior similar.  No vomiting or diarrhea.  No urinary complaints.  No sick contacts/recent travel, no new food ingestions or recent antibiotics.    Past Medical History:    Arrhythmia    \"palpitations\"    PAC (premature atrial contraction)    Psoriasis    Seafood allergy       Past Surgical History:   Procedure Laterality Date          x 3    Incision and drainage Right     rigth hand (bite by a cat)    Tonsillectomy      Tubal ligation              Family History   Problem Relation Age of Onset    Diabetes Father     Stroke Mother     No Known Problems Brother     Heart Attack Paternal Grandmother 72    Diabetes Paternal Grandmother     No Known Problems Paternal Grandfather     Breast Cancer Neg     Ovarian Cancer Neg     Colon Cancer Neg        Social History     Socioeconomic History    Marital status:    Occupational History    Occupation: retired stay at home [employer]     Comment: previously worked as    Tobacco Use    Smoking status: Former     Current packs/day: 0.00     Types: Cigarettes     Quit date:      Years since quittin.1    Smokeless tobacco: Never    Tobacco comments:     over 30 years ago   Vaping Use    Vaping status: Never Used   Substance and Sexual Activity    Alcohol use: Yes     Alcohol/week: 2.0 standard drinks of alcohol     Types: 2 Glasses of wine per week     Comment: Wine, weekly    Drug use: No    Sexual activity: Yes     Partners: Male   Other Topics Concern    Blood Transfusions No   Social History Narrative    Live with spouse      Feel safe       Review of Systems :  Constitutional: As per HPI  Gastrointestinal: (+) abd pain.    Positive for stated complaint: Abdominal pain  Other systems are as noted in HPI.  Constitutional and vital signs reviewed.      All other systems reviewed and negative except as noted above.    PSFH elements reviewed from today and agreed except as otherwise stated in HPI.    Physical Exam     ED Triage Vitals [02/09/25 0911]   /77   Pulse 82   Resp 16   Temp 100.3 °F (37.9 °C)   Temp src Temporal   SpO2 97 %   O2 Device None (Room air)       Current:/77   Pulse 82   Temp 100.3 °F (37.9 °C) (Temporal)   Resp 16   Ht 154.9 cm (5' 1\")   Wt 72.6 kg   LMP 03/01/2014 (Approximate)   SpO2 97%   BMI 30.23 kg/m²         Physical Exam   Constitutional: Nontoxic, uncomfortable appearing.  HEENT: MMM.  Head: Normocephalic. Atraumatic.  Eyes: No injection. No photophobia.  Cardiovascular: RRR.   Pulmonary/Chest: Effort normal. CTAB.  Abdominal: Soft. RLQ/McBurney tenderness without peritonitis.  Musculoskeletal: No gross deformity.  Neurological: Alert.   Skin: Skin is warm.   Psychiatric: Cooperative.  Nursing note and vitals reviewed.        ED Course     Labs Reviewed   CBC WITH DIFFERENTIAL WITH PLATELET - Abnormal; Notable for the following components:       Result Value    WBC 11.2 (*)     RDW-SD 47.4 (*)     Neutrophil Absolute Prelim 8.88 (*)     Neutrophil Absolute 8.88 (*)     All other components within normal limits   COMP METABOLIC PANEL (14) - Abnormal; Notable for the following components:    Glucose 129 (*)     Bilirubin, Total 1.3 (*)     All other components within normal limits   URINALYSIS WITH CULTURE REFLEX     CT ABDOMEN+PELVIS(CONTRAST ONLY)(CPT=74177)    Result Date: 2/9/2025  PROCEDURE: CT ABDOMEN + PELVIS (CONTRAST ONLY) (CPT=74177)  COMPARISON: None.  INDICATIONS: Abdominal pain  TECHNIQUE: CT images of the abdomen and pelvis were obtained with non-ionic intravenous contrast  material.  Automated exposure control for dose reduction was used. Adjustment of the mA and/or kV was done based on the patient's size. Use of iterative reconstruction technique for dose reduction was used.  Dose information is transmitted to the ACR (American College of Radiology) NRDR (National Radiology Data Registry) which includes the Dose Index Registry.  FINDINGS:  LOWER THORAX:  No visible pulmonary or pleural disease. LIVER:   No enlargement, atrophy, abnormal density, or significant focal lesion.  Scattered subcentimeter hypodensities in the liver are too small to accurately characterize but statistically favored to represent benign cysts, hemangiomas, or hamartomas. GALLBLADDER: Normal size and appearance. BILIARY:   No visible dilatation or calcification.  PANCREAS:   No lesion, fluid collection, ductal dilatation, or atrophy.  SPLEEN:   No enlargement or focal lesion.  ADRENALS:   No mass or enlargement.  KIDNEYS:   No mass, obstruction, or calcification.  RETROPERITONEUM:   No mass or enlarged adenopathy.  AORTA/VASCULAR:   No aneurysm or dissection. PERITONEUM: See below. GI TRACT/MESENTERY:    Inflamed dilated appendix with periappendiceal fat stranding.  No free air.  No bowel obstruction. URINARY BLADDER: Unremarkable. REPRODUCTIVE ORGANS: The uterus is present.  The ovaries are unremarkable.  Tubal ligation devices are seen. ABDOMINAL WALL:   No acute abnormality. BONES:  No acute fracture.  Mild spondylosis.  Minimal retrolisthesis of L2 on L3.          CONCLUSION:   Acute appendicitis.  No periappendiceal abscess or evidence of perforation.  Additional chronic or incidental findings are described in the body of this report.    Dictated by (CST): Agustín Hardwick MD on 2/09/2025 at 11:23 AM     Finalized by (CST): Agustín Hardwick MD on 2/09/2025 at 11:28 AM                MDM   DIFFERENTIAL DIAGNOSIS: After history and physical exam differential diagnosis includes but is not limited to appendicitis,  perforated viscus, UTI, ureterolithiasis.    Pulse ox: 97%:Normal on RA, as independently interpreted by myself    Medical Decision Making  Evaluation for lower abdominal pain associated with anorexia without fevers or peritonitis with low-grade fever noted in setting of leukocytosis without overt peritonitis - labs/UA as noted, CT with uncomplicated appendicits for which antibiotics initiated; case d/w hospitalist coverage Dr. Harper for admission, surgery Dr. Jules in consultation for surgical management with patient/family updated regarding findings/plan of care.    Problems Addressed:  Acute appendicitis with localized peritonitis, without perforation, abscess, or gangrene: acute illness or injury    Amount and/or Complexity of Data Reviewed  External Data Reviewed: labs and notes.     Details: 119/2024 outpatient primary care note/labs reviewed  Labs: ordered. Decision-making details documented in ED Course.  Radiology: ordered and independent interpretation performed. Decision-making details documented in ED Course.     Details: CT abd/pelvis without obvious free air as independently interpreted by myself    Discussion of management or test interpretation with external provider(s): Case d/w hospitalist Dr. Harper for admission, surgery Dr. Jules in consultation    Risk  Prescription drug management.  Parenteral controlled substances.  Decision regarding hospitalization.  Elective major surgery with no identified risk factors.      I was wearing at minimum a facemask and eye protection throughout this encounter with handwashing performed prior and after patient evaluation without personal hand/facial/oropharyngeal contact and gloves worn throughout encounter. See note and/or contact this provider for further PPE details.    Disposition and Plan     Clinical Impression:  1. Acute appendicitis with localized peritonitis, without perforation, abscess, or gangrene        Disposition:  Admit    Follow-up:  No follow-up  provider specified.    Medications Prescribed:  Current Discharge Medication List

## 2025-02-09 NOTE — BRIEF OP NOTE
Pre-Operative Diagnosis: Acute appendicitis [K35.80]     Post-Operative Diagnosis: Acute appendicitis [K35.80]      Procedure Performed:   LAPAROSCOPIC  APPENDECTOMY    Surgeons and Role:     * Chong Jules MD - Primary    Assistant(s):  Surgical Assistant.: Marvin Magdaleno CSA     Surgical Findings: Acute appendicitis     Specimen: Appendix     Estimated Blood Loss: 2cc    Dictation Number:      Chong Jules MD  2/9/2025  2:35 PM

## 2025-02-09 NOTE — H&P
[facility]  part of [facility]    History and Physical     Cesia Moore Patient Status:  Emergency    3/15/1965 MRN [id number]   Location [facility] EMERGENCY DEPARTMENT Attending Hai Hooper MD   Hosp Day # 0 PCP TODD SMITH MD       History of Present Illness: Cesia Moore is a 59 year old female presented to the ER with complaints of RLQ abdominal pain that began last night and was associated with nausea but no vomiting. She also endorsed fever and chills. She denied sick contacts or recent travel.   The patient denied any relieving factors.     Past Medical History:  Past Medical History:    Arrhythmia    \"palpitations\"    PAC (premature atrial contraction)    Psoriasis    Seafood allergy        Past Surgical History:   Past Surgical History:   Procedure Laterality Date          x 3    Incision and drainage Right     rigth hand (bite by a cat)    Tonsillectomy      Tubal ligation         Social History:  reports that she quit smoking about 36 years ago. Her smoking use included cigarettes. She has never used smokeless tobacco. She reports current alcohol use of about 2.0 standard drinks of alcohol per week. She reports that she does not use drugs.    Family History:   Family History   Problem Relation Age of Onset    Diabetes Father     Stroke Mother     No Known Problems Brother     Heart Attack Paternal Grandmother 72    Diabetes Paternal Grandmother     No Known Problems Paternal Grandfather     Breast Cancer Neg     Ovarian Cancer Neg     Colon Cancer Neg        Allergies: Allergies[1]    Medications:  Medications Ordered Prior to Encounter[2]    Review of Systems:   A comprehensive 14 point review of systems was completed.    Pertinent positives and negatives noted in the HPI.    Physical Exam:    /68   Pulse 76   Temp 100.3 °F (37.9 °C) (Temporal)   Resp 16   Ht 5' 1\" (1.549 m)   Wt 160 lb (72.6 kg)   LMP 2014 (Approximate)   SpO2 96%    BMI 30.23 kg/m²   General: No acute distress. Alert and oriented x 3.  Respiratory: Clear to auscultation bilaterally. No wheezes. No rhonchi.  Cardiovascular: S1, S2. Regular rate and rhythm. No murmurs, no rubs or gallops. Equal pulses.   Abdomen: Soft, nondistended.  RLQ TTP  Neurologic: No focal neurological deficits. CNII-XII grossly intact.  Extremities: No edema or cyanosis.      Diagnostic Data:      Labs:  Recent Labs   Lab 02/09/25  0922   WBC 11.2*   HGB 14.1   MCV 99.5   .0       Recent Labs   Lab 02/09/25  0922   *   BUN 13   CREATSERUM 0.90   CA 9.2   ALB 4.8      K 4.0      CO2 28.0   ALKPHO 74   AST 16   ALT 18   BILT 1.3*   TP 7.3       Estimated Creatinine Clearance: 50.2 mL/min (based on SCr of 0.9 mg/dL).    No results for input(s): \"PTP\", \"INR\" in the last 168 hours.    No results for input(s): \"TROP\", \"CK\" in the last 168 hours.    Imaging: Imaging data reviewed in Epic.      ASSESSMENT / PLAN:     Acute appendicitis  Imaging reviewed  Started on IVF  Continue IV Ceftriaxone and Flagyl  PRN pain control  Gsx on consult  Appreciate recs      Quality:  DVT Prophylaxis: Heparin s/c  CODE status: Full     Plan of care discussed with ED physician    Samina Darby MD  2/9/2025                         The 21st Century Cures Act makes medical notes like these available to patients in the interest of transparency. Please be advised this is a medical document. Medical documents are intended to carry relevant information, facts as evident, and the clinical opinion of the practitioner. The medical note is intended as peer to peer communication and may appear blunt or direct. It is written in medical language and may contain abbreviations or verbiage that are unfamiliar.          [1]   Allergies  Allergen Reactions    Fish Oil ANAPHYLAXIS    Seafood ANAPHYLAXIS    Vancomycin SWELLING     Swelling of the mouth, generalized itchiness and flushes.     Benzonatate Coughing   [2]   No  current facility-administered medications on file prior to encounter.     Current Outpatient Medications on File Prior to Encounter   Medication Sig Dispense Refill    Metoprolol Succinate ER 25 MG Oral Tablet 24 Hr Take 1 tablet (25 mg total) by mouth daily.  3

## 2025-02-09 NOTE — OPERATIVE REPORT
[facility]    PATIENT'S NAME: EMILIANO OLMEDO   ATTENDING PHYSICIAN: Chong Jules MD   OPERATING PHYSICIAN: Chong Jules MD   PATIENT ACCOUNT#:   [id number]    LOCATION:  [facility] PACU [address] [address]  MEDICAL RECORD #:   [id number]       YOB: 1965  ADMISSION DATE:       02/09/2025      OPERATION DATE:  02/09/2025    OPERATIVE REPORT      PREOPERATIVE DIAGNOSIS:  Acute appendicitis.  POSTOPERATIVE DIAGNOSIS:  Acute appendicitis.  PROCEDURE:  Laparoscopic appendectomy.    ASSISTANT:  MANDI Godfrey    ESTIMATED BLOOD LOSS:  Minimal.    COMPLICATIONS:  None.    ANESTHESIA:  General.    DISPOSITION:  To Recovery, tolerated well.    INDICATIONS:  The patient is a 59 with the above complaint.  Consent obtained.    OPERATIVE TECHNIQUE:  The patient was taken to surgery, prepped and draped in usual sterile fashion.  Veress needle placed at Stovall's point, abdomen insufflated.  A 5 mm trocar placed using Optiview.  Two additional trocars were placed under laparoscopic guidance.  Exploration revealed acute suppurative appendicitis.  The appendix was grasped, retracted. Mesoappendix taken using LigaSure.  The base transected with the Endo NEENA stapler and retrieved using an endobag.  All hemostasis assured.  Trocars removed.  Fascia closed with 0 Vicryl.  Skin closed with 3-0 Monocryl, benzoin, and Steri-Strips.    Dictated By Chong Jules MD  d: 02/09/2025 14:37:03  t: 02/09/2025 15:28:34  Job [id number]  GL/    cc: Yulissa Wright MD    
13-Aug-2017 14:46

## 2025-02-09 NOTE — ANESTHESIA POSTPROCEDURE EVALUATION
Patient: Cesia Moore    Procedure Summary       Date: 02/09/25 Room / Location: [facility] MAIN OR  / [facility] MAIN OR    Anesthesia Start: 1358 Anesthesia Stop: 1438    Procedure: LAPAROSCOPIC  APPENDECTOMY (Abdomen) Diagnosis:       Acute appendicitis      (Acute appendicitis [K35.80])    Surgeons: Chong Jules MD Anesthesiologist: Gonzalo Clayton DO    Anesthesia Type: general ASA Status: 2 - Emergent            Anesthesia Type: general    Vitals Value Taken Time   /78 02/09/25 1439   Temp 98.1 02/09/25 1439   Pulse 81 02/09/25 1438   Resp 6 02/09/25 1438   SpO2 97% 02/09/25 1439   Vitals shown include unfiled device data.    [facility] AN Post Evaluation:   Patient Evaluated in PACU  Patient Participation: complete - patient participated  Level of Consciousness: sleepy but conscious  Pain Management: adequate  Airway Patency:patent  Yes    Nausea/Vomiting: none  Cardiovascular Status: acceptable  Respiratory Status: acceptable  Postoperative Hydration acceptable      Gonzalo Clayton DO  2/9/2025 2:39 PM

## 2025-02-09 NOTE — PROGRESS NOTES
General surgery consult    Chart reviewed, CT reviewed.  Acute appendicitis.  Plan laparoscopic appendectomy

## 2025-02-09 NOTE — PLAN OF CARE
Admitted to 466 from ED. Oriented to room and safety precautions. A/O x 4. Abdominal lap sites clean/dry/intact. Reporting mild abdominal pain but declining pain medicaitons. Ambulating SBA. Voiding freely. IVF infusing, rocephin and flagyl per orders. Tolerating diet, denies nausea. Bed in lowest position, call light in reach, frequent rounding, nonskid footwear, fall precautions in place.          Problem: Patient Centered Care  Goal: Patient preferences are identified and integrated in the patient's plan of care  Description: Interventions:  - What would you like us to know as we care for you?   - Provide timely, complete, and accurate information to patient/family  - Incorporate patient and family knowledge, values, beliefs, and cultural backgrounds into the planning and delivery of care  - Encourage patient/family to participate in care and decision-making at the level they choose  - Honor patient and family perspectives and choices  Outcome: Progressing     Problem: Patient/Family Goals  Goal: Patient/Family Long Term Goal  Description: Patient's Long Term Goal: discharge home    Interventions:    - See additional Care Plan goals for specific interventions  Outcome: Progressing  Goal: Patient/Family Short Term Goal  Description: Patient's Short Term Goal: pain control    Interventions:     - See additional Care Plan goals for specific interventions  Outcome: Progressing     Problem: PAIN - ADULT  Goal: Verbalizes/displays adequate comfort level or patient's stated pain goal  Description: INTERVENTIONS:  - Encourage pt to monitor pain and request assistance  - Assess pain using appropriate pain scale  - Administer analgesics based on type and severity of pain and evaluate response  - Implement non-pharmacological measures as appropriate and evaluate response  - Consider cultural and social influences on pain and pain management  - Manage/alleviate anxiety  - Utilize distraction and/or relaxation techniques  -  Monitor for opioid side effects  - Notify MD/LIP if interventions unsuccessful or patient reports new pain  - Anticipate increased pain with activity and pre-medicate as appropriate  Outcome: Progressing     Problem: RISK FOR INFECTION - ADULT  Goal: Absence of fever/infection during anticipated neutropenic period  Description: INTERVENTIONS  - Monitor WBC  - Administer growth factors as ordered  - Implement neutropenic guidelines  Outcome: Progressing     Problem: SAFETY ADULT - FALL  Goal: Free from fall injury  Description: INTERVENTIONS:  - Assess pt frequently for physical needs  - Identify cognitive and physical deficits and behaviors that affect risk of falls.  - Coulters fall precautions as indicated by assessment.  - Educate pt/family on patient safety including physical limitations  - Instruct pt to call for assistance with activity based on assessment  - Modify environment to reduce risk of injury  - Provide assistive devices as appropriate  - Consider OT/PT consult to assist with strengthening/mobility  - Encourage toileting schedule  Outcome: Progressing     Problem: DISCHARGE PLANNING  Goal: Discharge to home or other facility with appropriate resources  Description: INTERVENTIONS:  - Identify barriers to discharge w/pt and caregiver  - Include patient/family/discharge partner in discharge planning  - Arrange for needed discharge resources and transportation as appropriate  - Identify discharge learning needs (meds, wound care, etc)  - Arrange for interpreters to assist at discharge as needed  - Consider post-discharge preferences of patient/family/discharge partner  - Complete POLST form as appropriate  - Assess patient's ability to be responsible for managing their own health  - Refer to Case Management Department for coordinating discharge planning if the patient needs post-hospital services based on physician/LIP order or complex needs related to functional status, cognitive ability or social  support system  Outcome: Progressing     Problem: GASTROINTESTINAL - ADULT  Goal: Minimal or absence of nausea and vomiting  Description: INTERVENTIONS:  - Maintain adequate hydration with IV or PO as ordered and tolerated  - Nasogastric tube to low intermittent suction as ordered  - Evaluate effectiveness of ordered antiemetic medications  - Provide nonpharmacologic comfort measures as appropriate  - Advance diet as tolerated, if ordered  - Obtain nutritional consult as needed  - Evaluate fluid balance  Outcome: Progressing  Goal: Maintains or returns to baseline bowel function  Description: INTERVENTIONS:  - Assess bowel function  - Maintain adequate hydration with IV or PO as ordered and tolerated  - Evaluate effectiveness of GI medications  - Encourage mobilization and activity  - Obtain nutritional consult as needed  - Establish a toileting routine/schedule  - Consider collaborating with pharmacy to review patient's medication profile  Outcome: Progressing     Problem: METABOLIC/FLUID AND ELECTROLYTES - ADULT  Goal: Electrolytes maintained within normal limits  Description: INTERVENTIONS:  - Monitor labs and rhythm and assess patient for signs and symptoms of electrolyte imbalances  - Administer electrolyte replacement as ordered  - Monitor response to electrolyte replacements, including rhythm and repeat lab results as appropriate  - Fluid restriction as ordered  - Instruct patient on fluid and nutrition restrictions as appropriate  Outcome: Progressing     Problem: SKIN/TISSUE INTEGRITY - ADULT  Goal: Incision(s), wounds(s) or drain site(s) healing without S/S of infection  Description: INTERVENTIONS:  - Assess and document risk factors for pressure ulcer development  - Assess and document skin integrity  - Assess and document dressing/incision, wound bed, drain sites and surrounding tissue  - Implement wound care per orders  - Initiate isolation precautions as appropriate  - Initiate Pressure Ulcer  prevention bundle as indicated  Outcome: Progressing

## 2025-02-09 NOTE — ED QUICK NOTES
Orders for admission, patient is aware of plan and ready to go upstairs. Any questions, please call ED CRESCENCIO Caraballo at extension [phone].     Patient Covid vaccination status: Fully vaccinated     COVID Test Ordered in ED: None    COVID Suspicion at Admission: N/A    Running Infusions:      Mental Status/LOC at time of transport: Alert    Other pertinent information:   CIWA score: N/A   NIH score:  N/A      A&Ox4 ambulatory and independent.

## 2025-02-10 VITALS
OXYGEN SATURATION: 95 % | DIASTOLIC BLOOD PRESSURE: 54 MMHG | SYSTOLIC BLOOD PRESSURE: 102 MMHG | HEIGHT: 61 IN | BODY MASS INDEX: 31.15 KG/M2 | HEART RATE: 63 BPM | RESPIRATION RATE: 18 BRPM | WEIGHT: 165 LBS | TEMPERATURE: 98 F

## 2025-02-10 LAB
ANION GAP SERPL CALC-SCNC: 7 MMOL/L (ref 0–18)
BASOPHILS # BLD AUTO: 0.04 X10(3) UL (ref 0–0.2)
BASOPHILS NFR BLD AUTO: 0.4 %
BUN BLD-MCNC: 10 MG/DL (ref 9–23)
BUN/CREAT SERPL: 14.7 (ref 10–20)
CALCIUM BLD-MCNC: 8.3 MG/DL (ref 8.7–10.4)
CHLORIDE SERPL-SCNC: 107 MMOL/L (ref 98–112)
CO2 SERPL-SCNC: 25 MMOL/L (ref 21–32)
CREAT BLD-MCNC: 0.68 MG/DL
DEPRECATED RDW RBC AUTO: 47.7 FL (ref 35.1–46.3)
EGFRCR SERPLBLD CKD-EPI 2021: 100 ML/MIN/1.73M2 (ref 60–?)
EOSINOPHIL # BLD AUTO: 0.01 X10(3) UL (ref 0–0.7)
EOSINOPHIL NFR BLD AUTO: 0.1 %
ERYTHROCYTE [DISTWIDTH] IN BLOOD BY AUTOMATED COUNT: 12.7 % (ref 11–15)
GLUCOSE BLD-MCNC: 115 MG/DL (ref 70–99)
HCT VFR BLD AUTO: 35 %
HGB BLD-MCNC: 11.5 G/DL
IMM GRANULOCYTES # BLD AUTO: 0.09 X10(3) UL (ref 0–1)
IMM GRANULOCYTES NFR BLD: 1 %
LYMPHOCYTES # BLD AUTO: 1.52 X10(3) UL (ref 1–4)
LYMPHOCYTES NFR BLD AUTO: 16.8 %
MAGNESIUM SERPL-MCNC: 1.8 MG/DL (ref 1.6–2.6)
MCH RBC QN AUTO: 33.5 PG (ref 26–34)
MCHC RBC AUTO-ENTMCNC: 32.9 G/DL (ref 31–37)
MCV RBC AUTO: 102 FL
MONOCYTES # BLD AUTO: 0.46 X10(3) UL (ref 0.1–1)
MONOCYTES NFR BLD AUTO: 5.1 %
NEUTROPHILS # BLD AUTO: 6.91 X10 (3) UL (ref 1.5–7.7)
NEUTROPHILS # BLD AUTO: 6.91 X10(3) UL (ref 1.5–7.7)
NEUTROPHILS NFR BLD AUTO: 76.6 %
OSMOLALITY SERPL CALC.SUM OF ELEC: 288 MOSM/KG (ref 275–295)
PLATELET # BLD AUTO: 135 10(3)UL (ref 150–450)
PLATELETS.RETICULATED NFR BLD AUTO: 4.1 % (ref 0–7)
POTASSIUM SERPL-SCNC: 3.9 MMOL/L (ref 3.5–5.1)
RBC # BLD AUTO: 3.43 X10(6)UL
SODIUM SERPL-SCNC: 139 MMOL/L (ref 136–145)
WBC # BLD AUTO: 9 X10(3) UL (ref 4–11)

## 2025-02-10 PROCEDURE — 99239 HOSP IP/OBS DSCHRG MGMT >30: CPT | Performed by: INTERNAL MEDICINE

## 2025-02-10 RX ORDER — METOPROLOL SUCCINATE 25 MG/1
25 TABLET, EXTENDED RELEASE ORAL DAILY
Status: DISCONTINUED | OUTPATIENT
Start: 2025-02-10 | End: 2025-02-10

## 2025-02-10 RX ORDER — MAGNESIUM OXIDE 400 MG/1
400 TABLET ORAL ONCE
Status: COMPLETED | OUTPATIENT
Start: 2025-02-10 | End: 2025-02-10

## 2025-02-10 RX ORDER — IBUPROFEN 600 MG/1
600 TABLET, FILM COATED ORAL EVERY 8 HOURS PRN
Qty: 30 TABLET | Refills: 0 | Status: SHIPPED | OUTPATIENT
Start: 2025-02-10

## 2025-02-10 RX ORDER — ACETAMINOPHEN 500 MG
1000 TABLET ORAL EVERY 4 HOURS PRN
Qty: 30 TABLET | Refills: 0 | Status: SHIPPED | OUTPATIENT
Start: 2025-02-10

## 2025-02-10 NOTE — PLAN OF CARE
Problem: Patient Centered Care  Goal: Patient preferences are identified and integrated in the patient's plan of care  Description: Interventions:  - What would you like us to know as we care for you?   - Provide timely, complete, and accurate information to patient/family  - Incorporate patient and family knowledge, values, beliefs, and cultural backgrounds into the planning and delivery of care  - Encourage patient/family to participate in care and decision-making at the level they choose  - Honor patient and family perspectives and choices  Outcome: Progressing     Problem: Patient/Family Goals  Goal: Patient/Family Long Term Goal  Description: Patient's Long Term Goal: discharge home    Interventions:    - See additional Care Plan goals for specific interventions  Outcome: Progressing  Goal: Patient/Family Short Term Goal  Description: Patient's Short Term Goal: pain control    Interventions:     - See additional Care Plan goals for specific interventions  Outcome: Progressing     Problem: PAIN - ADULT  Goal: Verbalizes/displays adequate comfort level or patient's stated pain goal  Description: INTERVENTIONS:  - Encourage pt to monitor pain and request assistance  - Assess pain using appropriate pain scale  - Administer analgesics based on type and severity of pain and evaluate response  - Implement non-pharmacological measures as appropriate and evaluate response  - Consider cultural and social influences on pain and pain management  - Manage/alleviate anxiety  - Utilize distraction and/or relaxation techniques  - Monitor for opioid side effects  - Notify MD/LIP if interventions unsuccessful or patient reports new pain  - Anticipate increased pain with activity and pre-medicate as appropriate  Outcome: Progressing     Problem: RISK FOR INFECTION - ADULT  Goal: Absence of fever/infection during anticipated neutropenic period  Description: INTERVENTIONS  - Monitor WBC  - Administer growth factors as ordered  -  Implement neutropenic guidelines  Outcome: Progressing     Problem: SAFETY ADULT - FALL  Goal: Free from fall injury  Description: INTERVENTIONS:  - Assess pt frequently for physical needs  - Identify cognitive and physical deficits and behaviors that affect risk of falls.  - [place] fall precautions as indicated by assessment.  - Educate pt/family on patient safety including physical limitations  - Instruct pt to call for assistance with activity based on assessment  - Modify environment to reduce risk of injury  - Provide assistive devices as appropriate  - Consider OT/PT consult to assist with strengthening/mobility  - Encourage toileting schedule  Outcome: Progressing     Problem: DISCHARGE PLANNING  Goal: Discharge to home or other facility with appropriate resources  Description: INTERVENTIONS:  - Identify barriers to discharge w/pt and caregiver  - Include patient/family/discharge partner in discharge planning  - Arrange for needed discharge resources and transportation as appropriate  - Identify discharge learning needs (meds, wound care, etc)  - Arrange for interpreters to assist at discharge as needed  - Consider post-discharge preferences of patient/family/discharge partner  - Complete POLST form as appropriate  - Assess patient's ability to be responsible for managing their own health  - Refer to Case Management Department for coordinating discharge planning if the patient needs post-hospital services based on physician/LIP order or complex needs related to functional status, cognitive ability or social support system  Outcome: Progressing     Problem: GASTROINTESTINAL - ADULT  Goal: Minimal or absence of nausea and vomiting  Description: INTERVENTIONS:  - Maintain adequate hydration with IV or PO as ordered and tolerated  - Nasogastric tube to low intermittent suction as ordered  - Evaluate effectiveness of ordered antiemetic medications  - Provide nonpharmacologic comfort measures as appropriate  -  Advance diet as tolerated, if ordered  - Obtain nutritional consult as needed  - Evaluate fluid balance  Outcome: Progressing  Goal: Maintains or returns to baseline bowel function  Description: INTERVENTIONS:  - Assess bowel function  - Maintain adequate hydration with IV or PO as ordered and tolerated  - Evaluate effectiveness of GI medications  - Encourage mobilization and activity  - Obtain nutritional consult as needed  - Establish a toileting routine/schedule  - Consider collaborating with pharmacy to review patient's medication profile  Outcome: Progressing     Problem: METABOLIC/FLUID AND ELECTROLYTES - ADULT  Goal: Electrolytes maintained within normal limits  Description: INTERVENTIONS:  - Monitor labs and rhythm and assess patient for signs and symptoms of electrolyte imbalances  - Administer electrolyte replacement as ordered  - Monitor response to electrolyte replacements, including rhythm and repeat lab results as appropriate  - Fluid restriction as ordered  - Instruct patient on fluid and nutrition restrictions as appropriate  Outcome: Progressing     Problem: SKIN/TISSUE INTEGRITY - ADULT  Goal: Incision(s), wounds(s) or drain site(s) healing without S/S of infection  Description: INTERVENTIONS:  - Assess and document risk factors for pressure ulcer development  - Assess and document skin integrity  - Assess and document dressing/incision, wound bed, drain sites and surrounding tissue  - Implement wound care per orders  - Initiate isolation precautions as appropriate  - Initiate Pressure Ulcer prevention bundle as indicated  Outcome: Progressing   Patient is doing well. Vital signs stable. Minimal to moderate pain on surgical site managed with Toradol. No nausea or vomiting. Tolerating regular diet. Passing flatus. Voids freely. IV fluids and antibiotics continued. Ambulates with standby assist. Possible discharge today.

## 2025-02-10 NOTE — DISCHARGE SUMMARY
[facility]  part of [facility]    DISCHARGE SUMMARY     Cesia Moore Patient Status:  Inpatient    3/15/1965 MRN [id number]   Location [facility] 4W/SW/SE Attending Chong Jules MD   Hosp Day # 1 PCP TODD SMITH MD     Date of Admission: 2025  Date of Discharge:  2/10/2025    Discharge Disposition: Home or Self Care    Discharge Diagnosis:     Acute appendicitis s/p lap appe    History of Present Illness:     Cesia Moore is a 59 year old female presented to the ER with complaints of RLQ abdominal pain that began last night and was associated with nausea but no vomiting. She also endorsed fever and chills. She denied sick contacts or recent travel.   The patient denied any relieving factors.     Brief Synopsis:     Patient tolerated surgery well and has been ambulating well. She tolerated her diet and denied any nausea or vomiting.   She has been cleared for discharge and will follow up with PCP and Gsx as opt for further care.   Discharge meds including pain meds and abx per surgery.     Patient is to remain compliant with all discharge medications and instructions and to follow up as advised.   Patient encouraged to make healthy lifestyle and dietary changes.    Lace+ Score: 34  59-90 High Risk  29-58 Medium Risk  0-28   Low Risk       TCM Follow-Up Recommendation:  LACE 29-58: Moderate Risk of readmission after discharge from the hospital.      Procedures during hospitalization:   Lap appendectomy    Incidental or significant findings and recommendations (brief descriptions):  None    Lab/Test results pending at Discharge:   None    Consultants:  Consultants         Provider   Role Specialty     Chong Jules MD      Consulting Physician SURGERY, GENERAL            Discharge Medication List:     Discharge Medications        CONTINUE taking these medications        Instructions Prescription details   metoprolol succinate ER 25 MG Tb24  Commonly known as: Toprol XL       Take 1 tablet (25 mg total) by mouth daily.   Refills: 3              [place]    Follow-up appointment:   Yulissa Wright MD  [address]  [place] [address] [phone]    Follow up in 1 week(s)      Chong Jules MD  [address]  [address]  [place] [id number] [phone]    Follow up in 1 week(s)      Appointments for Next 30 Days 2/10/2025 - 3/12/2025      None            Vital signs:  Temp:  [97.8 °F (36.6 °C)-98.3 °F (36.8 °C)] 98.3 °F (36.8 °C)  Pulse:  [57-79] 63  Resp:  [16-25] 18  BP: (102-146)/(54-77) 102/54  SpO2:  [90 %-100 %] 95 %    Physical Exam:    Gen: NAD AO x3  Chest: good air entry CTABL  CVS: normal s1 and s2 RR  Abd: NABS soft NT ND  Neuro: CN 2-12 grossly intact  Ext: no edema in bilateral LE    -----------------------------------------------------------------------------------------------  PATIENT DISCHARGE INSTRUCTIONS: See electronic chart    Samina Darby MD  Hospitalist    Time spent:  > 30 minutes    The 21st Century Cures Act makes medical notes like these available to patients in the interest of transparency. Please be advised this is a medical document. Medical documents are intended to carry relevant information, facts as evident, and the clinical opinion of the practitioner. The medical note is intended as peer to peer communication and may appear blunt or direct. It is written in medical language and may contain abbreviations or verbiage that are unfamiliar.

## 2025-02-10 NOTE — PROGRESS NOTES
[facility]  Progress Note    Cesia Moore Patient Status:  Inpatient    3/15/1965 MRN [id number]   Location [facility] 4W/SW/SE Attending Chong Jules MD   Hosp Day # 1 PCP TODD SMITH MD     Subjective:  Pt seen laying in bed. Pain controlled, eager for discharge. No fever or chills, tolerating diet without nausea or vomiting.    Objective/Physical Exam:  General: Alert, orientated x3.  Cooperative.  No apparent distress.  Vital Signs:  Blood pressure 102/54, pulse 63, temperature 98.3 °F (36.8 °C), temperature source Oral, resp. rate 18, height 5' 1\" (1.549 m), weight 165 lb (74.8 kg), last menstrual period 2014, SpO2 95%, not currently breastfeeding.  Wt Readings from Last 3 Encounters:   25 165 lb (74.8 kg)   24 163 lb (73.9 kg)   24 163 lb (73.9 kg)     Lungs: No respiratory distress.  Cardiac: Regular rate and rhythm.   Abdomen:  Soft, non distended, expected incisional tender, with no rebound or guarding.  No peritoneal signs.   Extremities:  No lower extremity edema noted.    Incision: Clean, dry, intact, no erythema    Intake/Output:    Intake/Output Summary (Last 24 hours) at 2/10/2025 1154  Last data filed at 2/10/2025 0804  Gross per 24 hour   Intake 728.33 ml   Output --   Net 728.33 ml     I/O last 3 completed shifts:  In: 488.3 [P.O.:240; I.V.:248.3]  Out: -   I/O this shift:  In: 240 [P.O.:240]  Out: -     Medications:    metoprolol succinate ER  25 mg Oral Daily    heparin  5,000 Units Subcutaneous Q8H SHANTE    cefTRIAXone  2 g Intravenous Q24H    metroNIDAZOLE  500 mg Intravenous q12h       Labs:  Lab Results   Component Value Date    WBC 9.0 02/10/2025    HGB 11.5 02/10/2025    HCT 35.0 02/10/2025    .0 02/10/2025     Lab Results   Component Value Date     02/10/2025    K 3.9 02/10/2025     02/10/2025    CO2 25.0 02/10/2025    BUN 10 02/10/2025    CREATSERUM 0.68 02/10/2025     02/10/2025    CA 8.3 02/10/2025     No  results found for: \"PT\", \"INR\"      Assessment  Patient Active Problem List   Diagnosis    Colonoscopy refused    PAC (premature atrial contraction)    Psoriasis    Arrhythmia    Seafood allergy    Abnormal electrocardiogram (ECG) (EKG)    Carotid bruit    Dyslipidemia    Palpitations    Acute appendicitis with localized peritonitis, without perforation, abscess, or gangrene       POD1 s/p lap appy    Plan:  Stable for discharge home from surgical standpoint  Follow up in 2 weeks for postoperative appointment  Ambulate and up to chair  DVT prophylaxis with heparin    Quality:  DVT Mechanical Prophylaxis:   SCDs,    DVT Pharmacologic Prophylaxis   Medication    heparin (Porcine) 5000 UNIT/ML injection 5,000 Units                Code Status: Full Code  Davila: No urinary catheter in place  Davila Duration (in days):   Central line:    TALAT: 2/10/2025        José Miguel San PA-C  2/10/2025  11:54 AM

## 2025-02-10 NOTE — CONSULTS
[facility]    PATIENT'S NAME: EMILIANO OLMEDO   ATTENDING PHYSICIAN: Chong Jules MD   CONSULTING PHYSICIAN: Chong Jules MD   PATIENT ACCOUNT#:   [id number]    LOCATION:  [address] [place] [address]  MEDICAL RECORD #:   [id number]       YOB: 1965  ADMISSION DATE:       2025      CONSULT DATE:  2025    REPORT OF CONSULTATION    REASON FOR CONSULTATION:  Acute appendicitis.    HISTORY OF PRESENT ILLNESS:  Patient is a 59 with 1-day history of abdominal pain now localized to the right lower quadrant.  CT shows uncomplicated acute appendicitis.  Patient has history of obesity, arrhythmia.    PAST SURGICAL HISTORY:   x3, I and D of the hand, tonsillectomy.    ALLERGIES:  Seafood.    SOCIAL HISTORY:  Worked as a .    FAMILY HISTORY:  Diabetes, stroke, hypertension.      REVIEW OF SYSTEMS:  Significant for abdominal pain, nausea, vomiting.      PHYSICAL EXAMINATION:    GENERAL:  Alert and oriented x3, uncomfortable, in no distress.    VITAL SIGNS:  Stable.  HEENT:  Normocephalic, atraumatic.  EOMI.  PERRLA.  NECK:  Supple without lymphadenopathy.  LUNGS:  Clear to auscultation.  No rhonchi or wheezing.  HEART:  Regular rate and rhythm.  No murmur, rubs, or gallops.  ABDOMEN:  Soft.  Tender right lower quadrant with guarding at McBurney's point.  EXTREMITIES:  Without clubbing, cyanosis, or edema.    LABORATORY DATA:  White count 11.2.  LFTs normal.      CT reviewed.      IMPRESSION:  Acute appendicitis.    PLAN:  Laparoscopic appendectomy.    Dictated By Chong Jules MD  d: 2025 11:40:37  t: 2025 13:14:01  Job [id number]  GL/

## 2025-02-10 NOTE — PLAN OF CARE
Patient alert and oriented x4. Vss. On room air. Tolerating diet, no nausea. Denies the need for prn pain medication. Ambulating independently. Voiding freely. Sx incisions c/d/I. Cleared for discharge. Instructions given. Education given.   Problem: Patient Centered Care  Goal: Patient preferences are identified and integrated in the patient's plan of care  Description: Interventions:  - What would you like us to know as we care for you?   - Provide timely, complete, and accurate information to patient/family  - Incorporate patient and family knowledge, values, beliefs, and cultural backgrounds into the planning and delivery of care  - Encourage patient/family to participate in care and decision-making at the level they choose  - Honor patient and family perspectives and choices  Outcome: Adequate for Discharge     Problem: Patient/Family Goals  Goal: Patient/Family Long Term Goal  Description: Patient's Long Term Goal: discharge home    Interventions:    - See additional Care Plan goals for specific interventions  Outcome: Adequate for Discharge  Goal: Patient/Family Short Term Goal  Description: Patient's Short Term Goal: pain control    Interventions:     - See additional Care Plan goals for specific interventions  Outcome: Adequate for Discharge     Problem: PAIN - ADULT  Goal: Verbalizes/displays adequate comfort level or patient's stated pain goal  Description: INTERVENTIONS:  - Encourage pt to monitor pain and request assistance  - Assess pain using appropriate pain scale  - Administer analgesics based on type and severity of pain and evaluate response  - Implement non-pharmacological measures as appropriate and evaluate response  - Consider cultural and social influences on pain and pain management  - Manage/alleviate anxiety  - Utilize distraction and/or relaxation techniques  - Monitor for opioid side effects  - Notify MD/LIP if interventions unsuccessful or patient reports new pain  - Anticipate  increased pain with activity and pre-medicate as appropriate  Outcome: Adequate for Discharge     Problem: RISK FOR INFECTION - ADULT  Goal: Absence of fever/infection during anticipated neutropenic period  Description: INTERVENTIONS  - Monitor WBC  - Administer growth factors as ordered  - Implement neutropenic guidelines  Outcome: Adequate for Discharge     Problem: SAFETY ADULT - FALL  Goal: Free from fall injury  Description: INTERVENTIONS:  - Assess pt frequently for physical needs  - Identify cognitive and physical deficits and behaviors that affect risk of falls.  - [place] fall precautions as indicated by assessment.  - Educate pt/family on patient safety including physical limitations  - Instruct pt to call for assistance with activity based on assessment  - Modify environment to reduce risk of injury  - Provide assistive devices as appropriate  - Consider OT/PT consult to assist with strengthening/mobility  - Encourage toileting schedule  Outcome: Adequate for Discharge     Problem: DISCHARGE PLANNING  Goal: Discharge to home or other facility with appropriate resources  Description: INTERVENTIONS:  - Identify barriers to discharge w/pt and caregiver  - Include patient/family/discharge partner in discharge planning  - Arrange for needed discharge resources and transportation as appropriate  - Identify discharge learning needs (meds, wound care, etc)  - Arrange for interpreters to assist at discharge as needed  - Consider post-discharge preferences of patient/family/discharge partner  - Complete POLST form as appropriate  - Assess patient's ability to be responsible for managing their own health  - Refer to Case Management Department for coordinating discharge planning if the patient needs post-hospital services based on physician/LIP order or complex needs related to functional status, cognitive ability or social support system  Outcome: Adequate for Discharge     Problem: GASTROINTESTINAL - ADULT  Goal:  Minimal or absence of nausea and vomiting  Description: INTERVENTIONS:  - Maintain adequate hydration with IV or PO as ordered and tolerated  - Nasogastric tube to low intermittent suction as ordered  - Evaluate effectiveness of ordered antiemetic medications  - Provide nonpharmacologic comfort measures as appropriate  - Advance diet as tolerated, if ordered  - Obtain nutritional consult as needed  - Evaluate fluid balance  Outcome: Adequate for Discharge  Goal: Maintains or returns to baseline bowel function  Description: INTERVENTIONS:  - Assess bowel function  - Maintain adequate hydration with IV or PO as ordered and tolerated  - Evaluate effectiveness of GI medications  - Encourage mobilization and activity  - Obtain nutritional consult as needed  - Establish a toileting routine/schedule  - Consider collaborating with pharmacy to review patient's medication profile  Outcome: Adequate for Discharge     Problem: METABOLIC/FLUID AND ELECTROLYTES - ADULT  Goal: Electrolytes maintained within normal limits  Description: INTERVENTIONS:  - Monitor labs and rhythm and assess patient for signs and symptoms of electrolyte imbalances  - Administer electrolyte replacement as ordered  - Monitor response to electrolyte replacements, including rhythm and repeat lab results as appropriate  - Fluid restriction as ordered  - Instruct patient on fluid and nutrition restrictions as appropriate  Outcome: Adequate for Discharge     Problem: SKIN/TISSUE INTEGRITY - ADULT  Goal: Incision(s), wounds(s) or drain site(s) healing without S/S of infection  Description: INTERVENTIONS:  - Assess and document risk factors for pressure ulcer development  - Assess and document skin integrity  - Assess and document dressing/incision, wound bed, drain sites and surrounding tissue  - Implement wound care per orders  - Initiate isolation precautions as appropriate  - Initiate Pressure Ulcer prevention bundle as indicated  Outcome: Adequate for  Discharge

## 2025-02-11 ENCOUNTER — TELEPHONE (OUTPATIENT)
Dept: INTERNAL MEDICINE CLINIC | Facility: CLINIC | Age: 60
End: 2025-02-11

## 2025-02-11 NOTE — PAYOR COMM NOTE
--------------  ADMISSION REVIEW     Payor: [employer] [facility] PPO  Subscriber #:  [id number]  Authorization Number: [id number]    Admit date: 25  Admit time:  3:39 PM       Patient Seen in: [facility] Emergency Department    History   Stated Complaint: Abdominal pain     59-year-old female without past medical history presenting with  for evaluation with lower abdominal pain since yesterday morning associated with decreased oral intake.  Symptoms ongoing despite acetaminophen, Pepto-Bismol, Gas-X.  No prior similar.  No vomiting or diarrhea.  No urinary complaints.  No sick contacts/recent travel, no new food ingestions or recent antibiotics.    Past Medical History:    Arrhythmia    \"palpitations\"    PAC (premature atrial contraction)    Psoriasis    Seafood allergy     Past Surgical History:   Procedure Laterality Date          x 3    Incision and drainage Right     rigth hand (bite by a cat)    Tonsillectomy      Tubal ligation         Physical Exam     ED Triage Vitals [25 0911]   /77   Pulse 82   Resp 16   Temp 100.3 °F (37.9 °C)   Temp src Temporal   SpO2 97 %   O2 Device None (Room air)     Current:/77   Pulse 82   Temp 100.3 °F (37.9 °C) (Temporal)   Resp 16   Ht 154.9 cm (5' 1\")   Wt 72.6 kg   LMP 2014 (Approximate)   SpO2 97%   BMI 30.23 kg/m²     Physical Exam   Constitutional: Nontoxic, uncomfortable appearing.  HEENT: MMM.  Head: Normocephalic. Atraumatic.  Eyes: No injection. No photophobia.  Cardiovascular: RRR.   Pulmonary/Chest: Effort normal. CTAB.  Abdominal: Soft. RLQ/McBurney tenderness without peritonitis.  Musculoskeletal: No gross deformity.  Neurological: Alert.   Skin: Skin is warm.   Psychiatric: Cooperative.  Nursing note and vitals reviewed.    Labs Reviewed   CBC WITH DIFFERENTIAL WITH PLATELET - Abnormal; Notable for the following components:       Result Value    WBC 11.2 (*)     RDW-SD 47.4 (*)     Neutrophil Absolute  Prelim 8.88 (*)     Neutrophil Absolute 8.88 (*)     All other components within normal limits   COMP METABOLIC PANEL (14) - Abnormal; Notable for the following components:    Glucose 129 (*)     Bilirubin, Total 1.3 (*)     All other components within normal limits   URINALYSIS WITH CULTURE REFLEX     CT ABDOMEN+PELVIS   Acute appendicitis.  No periappendiceal abscess or evidence of perforation.    MDM     Medical Decision Making  Evaluation for lower abdominal pain associated with anorexia without fevers or peritonitis with low-grade fever noted in setting of leukocytosis without overt peritonitis - labs/UA as noted, CT with uncomplicated appendicits for which antibiotics initiated; case d/w hospitalist coverage Dr. Harper for admission, surgery Dr. Jules in consultation for surgical management with patient/family updated regarding findings/plan of care.    Discussion of management or test interpretation with external provider(s): Case d/w hospitalist Dr. Harper for admission, surgery Dr. Jules in consultation    Disposition and Plan     Clinical Impression:  1. Acute appendicitis with localized peritonitis, without perforation, abscess, or gangrene        SURGERY:    REASON FOR CONSULTATION:  Acute appendicitis.     HISTORY OF PRESENT ILLNESS:  Patient is a 59 with 1-day history of abdominal pain now localized to the right lower quadrant.  CT shows uncomplicated acute appendicitis.  Patient has history of obesity, arrhythmia.     PAST SURGICAL HISTORY:   x3, I and D of the hand, tonsillectomy.     ALLERGIES:  Seafood.     SOCIAL HISTORY:  Worked as a .     FAMILY HISTORY:  Diabetes, stroke, hypertension.       REVIEW OF SYSTEMS:  Significant for abdominal pain, nausea, vomiting.       PHYSICAL EXAMINATION:    GENERAL:  Alert and oriented x3, uncomfortable, in no distress.    VITAL SIGNS:  Stable.  HEENT:  Normocephalic, atraumatic.  EOMI.  PERRLA.  NECK:  Supple without lymphadenopathy.  LUNGS:   Clear to auscultation.  No rhonchi or wheezing.  HEART:  Regular rate and rhythm.  No murmur, rubs, or gallops.  ABDOMEN:  Soft.  Tender right lower quadrant with guarding at McBurney's point.  EXTREMITIES:  Without clubbing, cyanosis, or edema.     LABORATORY DATA:  White count 11.2.  LFTs normal.       CT reviewed.       IMPRESSION:  Acute appendicitis.     PLAN:  Laparoscopic appendectomy.       OPERATIVE REPORT      PREOPERATIVE DIAGNOSIS:  Acute appendicitis.  POSTOPERATIVE DIAGNOSIS:  Acute appendicitis.  PROCEDURE:  Laparoscopic appendectomy.      OPERATIVE TECHNIQUE:  The patient was taken to surgery, prepped and draped in usual sterile fashion.  Veress needle placed at Stovall's point, abdomen insufflated.  A 5 mm trocar placed using Optiview.  Two additional trocars were placed under laparoscopic guidance.  Exploration revealed acute suppurative appendicitis.  The appendix was grasped, retracted. Mesoappendix taken using LigaSure.  The base transected with the Endo NEENA stapler and retrieved using an endobag.  All hemostasis assured.  Trocars removed.  Fascia closed with 0 Vicryl.  Skin closed with 3-0 Monocryl, benzoin, and Steri-Strips.    NURSING:    IVF infusing, rocephin and flagyl per orders.     Vitals (last day) before discharge       Date/Time Temp Pulse Resp BP SpO2 Weight O2 Device O2 Flow Rate (L/min) Who    02/10/25 0955 -- 63 -- 102/54 -- -- -- -- DD    02/10/25 0526 -- 57 18 109/64 95 % -- None (Room air) -- AA    02/09/25 2136 -- -- -- -- 93 % -- None (Room air) -- VT    02/09/25 2101 98.3 °F (36.8 °C) 74 18 105/56 90 % -- None (Room air) -- AA    02/09/25 1540 -- 76 18 105/58 98 % 165 lb (74.8 kg) Nasal cannula 2 L/min MK    02/09/25 1510 97.8 °F (36.6 °C) 75 25 146/75 100 % -- Nasal cannula 2 L/min IA    02/09/25 1500 -- 79 22 140/77 100 % -- Nasal cannula 2 L/min IA    02/09/25 1450 -- 79 22 123/72 99 % -- Nasal cannula 2 L/min IA    02/09/25 1440 98 °F (36.7 °C) 74 19 103/59 91 % --  Nasal cannula 3 L/min NJ    02/09/25 1200 -- 76 16 134/68 96 % -- None (Room air) -- CB    02/09/25 0911 100.3 °F (37.9 °C) 82 16 120/77 97 % 160 lb (72.6 kg) None (Room air) -- MARCO

## 2025-02-11 NOTE — TELEPHONE ENCOUNTER
Patient was in the hospital on 2/9/2025 and had an appendectomy. Was discharged yesterday and told to follow up in 1 week. Patient feels alittle weak. Eating and drinking. Walking around and passing gas. Urinating and having bowel movements. No fevers. Taking tylenol and motrin for the pain. No other symptoms. Wanted to schedule hospital follow up with Dr Yulissa Wright in [place]. Appointment made for 2/18/2025 at 10:45am with Dr Yulissa Wright in [place].   Advised patient that if symptoms worse to give us a call back. Patient agreed.

## 2025-02-11 NOTE — PAYOR COMM NOTE
--------------  DISCHARGE REVIEW    Payor: [employer] [facility] PPO  Subscriber #:  [id number]  Authorization Number: [id number]    Admit date: 25  Admit time:   3:39 PM  Discharge Date: 2/10/2025 12:21 PM     Admitting Physician: Samina Darby MD  Attending Physician:  No att. providers found  Primary Care Physician: Yulissa Wright MD          Discharge Summary Notes        Discharge Summary signed by Samina Darby MD at 2/10/2025 10:15 AM       Author: Samina Darby MD Specialty: HOSPITALIST, Internal Medicine Author Type: Physician    Filed: 2/10/2025 10:15 AM Date of Service: 2/10/2025 10:11 AM Status: Signed    : Samina Darby MD (Physician)           [facility]  part of [facility]    DISCHARGE SUMMARY     Cesia Moore Patient Status:  Inpatient    3/15/1965 MRN [id number]   Location [facility] 4W/SW/SE Attending Chong Jules MD   Hosp Day # 1 PCP YULISSA WRIGHT MD     Date of Admission: 2025  Date of Discharge:  2/10/2025    Discharge Disposition: Home or Self Care    Discharge Diagnosis:     Acute appendicitis s/p lap appe    History of Present Illness:     Cesia Moore is a 59 year old female presented to the ER with complaints of RLQ abdominal pain that began last night and was associated with nausea but no vomiting. She also endorsed fever and chills. She denied sick contacts or recent travel.   The patient denied any relieving factors.     Brief Synopsis:     Patient tolerated surgery well and has been ambulating well. She tolerated her diet and denied any nausea or vomiting.   She has been cleared for discharge and will follow up with PCP and Gsx as opt for further care.   Discharge meds including pain meds and abx per surgery.     Patient is to remain compliant with all discharge medications and instructions and to follow up as advised.   Patient encouraged to make healthy lifestyle and dietary changes.    Lace+ Score: 34  59-90 High Risk  29-58 Medium  Risk  0-28   Low Risk       TCM Follow-Up Recommendation:  LACE 29-58: Moderate Risk of readmission after discharge from the hospital.      Procedures during hospitalization:   Lap appendectomy    Incidental or significant findings and recommendations (brief descriptions):  None    Lab/Test results pending at Discharge:   None    Consultants:  Consultants         Provider   Role Specialty     Chong Jules MD      Consulting Physician SURGERY, GENERAL            Discharge Medication List:     Discharge Medications        CONTINUE taking these medications        Instructions Prescription details   metoprolol succinate ER 25 MG Tb24  Commonly known as: Toprol XL      Take 1 tablet (25 mg total) by mouth daily.   Refills: 3              [place]    Follow-up appointment:   Yulissa Wright MD  [address]  [place] [address] [phone]    Follow up in 1 week(s)      Chong Jules MD  [address]  [address]  [place] [address] [phone]    Follow up in 1 week(s)      Appointments for Next 30 Days 2/10/2025 - 3/12/2025      None            Vital signs:  Temp:  [97.8 °F (36.6 °C)-98.3 °F (36.8 °C)] 98.3 °F (36.8 °C)  Pulse:  [57-79] 63  Resp:  [16-25] 18  BP: (102-146)/(54-77) 102/54  SpO2:  [90 %-100 %] 95 %    Physical Exam:    Gen: NAD AO x3  Chest: good air entry CTABL  CVS: normal s1 and s2 RR  Abd: NABS soft NT ND  Neuro: CN 2-12 grossly intact  Ext: no edema in bilateral LE    -----------------------------------------------------------------------------------------------  PATIENT DISCHARGE INSTRUCTIONS: See electronic chart    Samina Darby MD  Hospitalist    Time spent:  > 30 minutes    The 21st Century Cures Act makes medical notes like these available to patients in the interest of transparency. Please be advised this is a medical document. Medical documents are intended to carry relevant information, facts as evident, and the clinical opinion of the practitioner. The medical note is  intended as peer to peer communication and may appear blunt or direct. It is written in medical language and may contain abbreviations or verbiage that are unfamiliar.     Electronically signed by Samina Darby MD on 2/10/2025 10:15 AM         REVIEWER COMMENTS

## 2025-02-18 ENCOUNTER — OFFICE VISIT (OUTPATIENT)
Dept: INTERNAL MEDICINE CLINIC | Facility: CLINIC | Age: 60
End: 2025-02-18
Payer: COMMERCIAL

## 2025-02-18 VITALS
HEART RATE: 69 BPM | HEIGHT: 61 IN | WEIGHT: 160.38 LBS | TEMPERATURE: 97 F | SYSTOLIC BLOOD PRESSURE: 90 MMHG | DIASTOLIC BLOOD PRESSURE: 62 MMHG | OXYGEN SATURATION: 98 % | BODY MASS INDEX: 30.28 KG/M2

## 2025-02-18 DIAGNOSIS — Z90.49 S/P LAPAROSCOPIC APPENDECTOMY: Primary | ICD-10-CM

## 2025-02-18 PROCEDURE — 3078F DIAST BP <80 MM HG: CPT | Performed by: INTERNAL MEDICINE

## 2025-02-18 PROCEDURE — 3008F BODY MASS INDEX DOCD: CPT | Performed by: INTERNAL MEDICINE

## 2025-02-18 PROCEDURE — 3074F SYST BP LT 130 MM HG: CPT | Performed by: INTERNAL MEDICINE

## 2025-02-18 PROCEDURE — 99213 OFFICE O/P EST LOW 20 MIN: CPT | Performed by: INTERNAL MEDICINE

## 2025-02-18 NOTE — PROGRESS NOTES
Subjective:     Patient ID: Cesia Moore is a 59 year old female.    HPI    History/Other:   She came in today for follow-up after she was discharged from the hospital    She was admitted due to appendicitis  Underwent lap appendectomy, and she was discharged home    Review of Systems   Constitutional: Negative.    HENT: Negative.     Eyes: Negative.    Respiratory: Negative.     Cardiovascular: Negative.    Gastrointestinal: Negative.    Endocrine: Negative.    Musculoskeletal: Negative.    Skin: Negative.    Neurological: Negative.    Hematological: Negative.    Psychiatric/Behavioral:  Negative for agitation.      Current Outpatient Medications   Medication Sig Dispense Refill    ibuprofen 600 MG Oral Tab Take 1 tablet (600 mg total) by mouth every 8 (eight) hours as needed. 30 tablet 0    acetaminophen 500 MG Oral Tab Take 2 tablets (1,000 mg total) by mouth every 4 (four) hours as needed for Pain. 30 tablet 0    Metoprolol Succinate ER 25 MG Oral Tablet 24 Hr Take 1 tablet (25 mg total) by mouth daily.  3     Allergies:Allergies[1]    Past Medical History:    Arrhythmia    \"palpitations\"    PAC (premature atrial contraction)    Psoriasis    Seafood allergy      Past Surgical History:   Procedure Laterality Date          x 3    Incision and drainage Right     rigth hand (bite by a cat)    Tonsillectomy      Tubal ligation        Family History   Problem Relation Age of Onset    Diabetes Father     Stroke Mother     No Known Problems Brother     Heart Attack Paternal Grandmother 72    Diabetes Paternal Grandmother     No Known Problems Paternal Grandfather     Breast Cancer Neg     Ovarian Cancer Neg     Colon Cancer Neg       Social History:   Social History     Socioeconomic History    Marital status:    Occupational History    Occupation: retired stay at home [employer]     Comment: previously worked as    Tobacco Use    Smoking status: Former     Current packs/day: 0.00      Types: Cigarettes     Quit date:      Years since quittin.1    Smokeless tobacco: Never    Tobacco comments:     over 30 years ago   Vaping Use    Vaping status: Never Used   Substance and Sexual Activity    Alcohol use: Yes     Alcohol/week: 2.0 standard drinks of alcohol     Types: 2 Glasses of wine per week     Comment: Wine, weekly    Drug use: No    Sexual activity: Yes     Partners: Male   Other Topics Concern    Blood Transfusions No   Social History Narrative    Live with spouse     Feel safe     Social Drivers of Health     Food Insecurity: No Food Insecurity (2025)    NCSS - Food Insecurity     Worried About Running Out of Food in the Last Year: No     Ran Out of Food in the Last Year: No   Transportation Needs: No Transportation Needs (2025)    NCSS - Transportation     Lack of Transportation: No   Housing Stability: Not At Risk (2025)    NCSS - Housing/Utilities     Has Housing: Yes     Worried About Losing Housing: No     Unable to Get Utilities: No        Objective:   Physical Exam  Vitals and nursing note reviewed.   Constitutional:       Appearance: Normal appearance.   HENT:      Head: Normocephalic and atraumatic.   Cardiovascular:      Rate and Rhythm: Normal rate and regular rhythm.      Pulses: Normal pulses.      Heart sounds: Normal heart sounds.   Pulmonary:      Effort: Pulmonary effort is normal.      Breath sounds: Normal breath sounds.   Abdominal:      Palpations: Abdomen is soft.   Musculoskeletal:         General: Normal range of motion.      Cervical back: Normal range of motion and neck supple.   Skin:     General: Skin is warm and dry.   Neurological:      Mental Status: She is alert. Mental status is at baseline.         Assessment & Plan:   No diagnosis found.  Appendicitis status post appendectomy- post op doing ok   No orders of the defined types were placed in this encounter.      Meds This Visit:  Requested Prescriptions      No prescriptions requested or  ordered in this encounter       Imaging & Referrals:  None            [1]   Allergies  Allergen Reactions    Fish Oil ANAPHYLAXIS    Seafood ANAPHYLAXIS    Vancomycin SWELLING     Swelling of the mouth, generalized itchiness and flushes.     Benzonatate Coughing

## 2025-02-24 ENCOUNTER — NURSE ONLY (OUTPATIENT)
Dept: SURGERY | Facility: CLINIC | Age: 60
End: 2025-02-24
Payer: COMMERCIAL

## 2025-02-24 VITALS
HEIGHT: 61 IN | BODY MASS INDEX: 30.4 KG/M2 | DIASTOLIC BLOOD PRESSURE: 52 MMHG | WEIGHT: 161 LBS | SYSTOLIC BLOOD PRESSURE: 111 MMHG

## 2025-02-24 DIAGNOSIS — Z48.89 ENCOUNTER FOR POSTOPERATIVE CARE: Primary | ICD-10-CM

## 2025-02-24 PROCEDURE — 99024 POSTOP FOLLOW-UP VISIT: CPT

## 2025-02-24 PROCEDURE — 3008F BODY MASS INDEX DOCD: CPT

## 2025-02-24 PROCEDURE — 3078F DIAST BP <80 MM HG: CPT

## 2025-02-24 PROCEDURE — 3074F SYST BP LT 130 MM HG: CPT

## 2025-02-24 NOTE — PROGRESS NOTES
Follow Up Visit Note       Active Problems      1. Encounter for postoperative care          Chief Complaint   Chief Complaint   Patient presents with    Post-Op     Patient is here for post op Emergency LSC appendectomy with Dr. Jules.  She had slight bruising near the incisions and reaction to the adhesive from the dressings. She is having normal BM's and Urination.          History of Present Illness  Cesia Moore is a pleasant 59 year old year old patient presenting for post op appointment. She generally feels well, she denies abdominal pain. Reports some periumbilical soreness with movement. She is tolerating a general diet without diarrhea or constipation. She denies fever, chills, SOB, chest pain, leg swelling or calf tenderness.       Allergies  Cesia is allergic to fish oil, seafood, vancomycin, and benzonatate.    Past Medical / Surgical / Social / Family History    The past medical and past surgical history have been reviewed by me today.    Past Medical History:    Arrhythmia    \"palpitations\"    PAC (premature atrial contraction)    Psoriasis    Seafood allergy     Past Surgical History:   Procedure Laterality Date          x 3    Incision and drainage Right     rigth hand (bite by a cat)    Tonsillectomy      Tubal ligation         The family history and social history have been reviewed by me today.    Family History   Problem Relation Age of Onset    Diabetes Father     Stroke Mother     No Known Problems Brother     Heart Attack Paternal Grandmother 72    Diabetes Paternal Grandmother     No Known Problems Paternal Grandfather     Breast Cancer Neg     Ovarian Cancer Neg     Colon Cancer Neg      Social History     Socioeconomic History    Marital status:    Occupational History    Occupation: retired stay at home [employer]     Comment: previously worked as    Tobacco Use    Smoking status: Former     Current packs/day: 0.00     Types: Cigarettes     Quit date:       Years since quittin.1    Smokeless tobacco: Never    Tobacco comments:     over 30 years ago   Vaping Use    Vaping status: Never Used   Substance and Sexual Activity    Alcohol use: Yes     Alcohol/week: 2.0 standard drinks of alcohol     Types: 2 Glasses of wine per week     Comment: Wine, weekly    Drug use: No    Sexual activity: Yes     Partners: Male   Other Topics Concern    Blood Transfusions No        Current Outpatient Medications:     ibuprofen 600 MG Oral Tab, Take 1 tablet (600 mg total) by mouth every 8 (eight) hours as needed., Disp: 30 tablet, Rfl: 0    acetaminophen 500 MG Oral Tab, Take 2 tablets (1,000 mg total) by mouth every 4 (four) hours as needed for Pain., Disp: 30 tablet, Rfl: 0    Metoprolol Succinate ER 25 MG Oral Tablet 24 Hr, Take 1 tablet (25 mg total) by mouth daily., Disp: , Rfl: 3     Review of Systems  The Review of Systems has been reviewed by me during today.  Review of Systems   Constitutional:  Negative for activity change, chills, fatigue and fever.   Gastrointestinal:  Negative for abdominal distention, abdominal pain, constipation, diarrhea, nausea and vomiting.        Physical Findings   /52 (BP Location: Right arm)   Ht 5' 1\" (1.549 m)   Wt 161 lb (73 kg)   LMP 2014 (Approximate)   BMI 30.42 kg/m²   Physical Exam  Constitutional:       Appearance: Normal appearance. She is normal weight.   Cardiovascular:      Rate and Rhythm: Normal rate and regular rhythm.      Pulses: Normal pulses.   Pulmonary:      Effort: Pulmonary effort is normal.      Breath sounds: Normal breath sounds.   Abdominal:      General: Abdomen is flat. A surgical scar is present. Bowel sounds are normal.      Palpations: Abdomen is soft.      Tenderness: There is no abdominal tenderness.      Comments: Surgical incisions are healing well. Clean, dry, and intact. Some skin irritation and dryness surrounding the umbilicus from adhesives.    Skin:     General: Skin is warm and dry.       Capillary Refill: Capillary refill takes less than 2 seconds.   Neurological:      Mental Status: She is alert.   Psychiatric:         Mood and Affect: Mood normal.         Behavior: Behavior normal.         Thought Content: Thought content normal.          Assessment   1. Encounter for postoperative care      Pathology reviewed with the patient    Plan   Continue avoiding heavy lifting for another 2-4 weeks.  Continue good hygiene of incision site.  OK to swim or take a bath.  May continue regular diet and continue adequate hydration.  Follow up as needed.       No orders of the defined types were placed in this encounter.      Imaging & Referrals   None    Follow Up  No follow-ups on file.    Aracelis Shah PA-C

## 2025-07-16 ENCOUNTER — OFFICE VISIT (OUTPATIENT)
Dept: OBGYN CLINIC | Facility: CLINIC | Age: 60
End: 2025-07-16
Payer: COMMERCIAL

## 2025-07-16 VITALS — SYSTOLIC BLOOD PRESSURE: 116 MMHG | DIASTOLIC BLOOD PRESSURE: 68 MMHG | WEIGHT: 158.38 LBS | BODY MASS INDEX: 30 KG/M2

## 2025-07-16 DIAGNOSIS — Z01.419 ENCOUNTER FOR GYNECOLOGICAL EXAMINATION WITHOUT ABNORMAL FINDING: Primary | ICD-10-CM

## 2025-07-16 DIAGNOSIS — Z12.31 SCREENING MAMMOGRAM FOR BREAST CANCER: ICD-10-CM

## 2025-07-16 PROCEDURE — 3078F DIAST BP <80 MM HG: CPT | Performed by: OBSTETRICS & GYNECOLOGY

## 2025-07-16 PROCEDURE — 3074F SYST BP LT 130 MM HG: CPT | Performed by: OBSTETRICS & GYNECOLOGY

## 2025-07-16 PROCEDURE — 99396 PREV VISIT EST AGE 40-64: CPT | Performed by: OBSTETRICS & GYNECOLOGY

## 2025-07-16 NOTE — PROGRESS NOTES
GYN H&P     2025  9:36 AM    CC: Patient is here for annual    HPI: Patient is a 60 year old  for annual. No hot flashes or vaginal dryneess           Declines colonoscopy or cologuard      Patient's last menstrual period was 2014 (approximate).    OB History    Para Term  AB Living   3 3 3   3   SAB IAB Ectopic Multiple Live Births       3      # Outcome Date GA Lbr Willy/2nd Weight Sex Type Anes PTL Lv   3 Term      Caesarean   REBA   2 Term      Caesarean   REBA   1 Term      Caesarean   REBA       GYN hx:    Hx Prior Abnormal Pap: No  Pap Date: 24  Pap Result Notes: WNL  Follow Up Recommendation: mammo done 3/24 wnl      Past Medical History[1]  Past Surgical History[2]  Allergies[3]  Family History[4]  Set as collapsible by default.[5]  Social History     Social History Narrative    Live with spouse     Feel safe       Medications reviewed. See active list.     /68   Wt 158 lb 6.4 oz (71.8 kg)   LMP 2014 (Approximate)   BMI 29.93 kg/m²       Exam:   GENERAL: well developed, well nourished, in no apparent distress  SKIN: no rashes, no suspicious lesions  HEENT: normal  NECK: supple; no thyroidmegaly, no adenopathy  BREASTS: symmetrical, nontender, no palpable masses or nodes, no nipple discharge, no skin changes, no dippling, no palpable axillary adenopathy  ABDOMEN: Soft, non distended; non tender, no masses.  Liver and spleen non-tender, no enlargement. No palpable hernias  GYNE/:  External Genitalia: Normal appearing, no lesions, normal hair distribution   Urethral meatus appear wnl, no abnormal discharge or lesions noted.   Bladder: well supported, urethra wnl, no palpable tenderness or masses, no discharge  Vagina: normal pink mucosa, no lesions, normal clear discharge.   Uterus: midline, mobile, non-tender, firm and smooth  Cervix: pink, no lesions grossly visible, no discharge  Adnexa: non tender, no palpable masses, normal size  Anus:  No lesions or  visible hemorrhoids        A/P: Patient is 60 year old female     1. Encounter for gynecological examination without abnormal finding    2. Screening mammogram for breast cancer  - [facility] ABRAHAN 2D+3D SCREENING BILAT (CPT=77067/09100); Future    Refuses colon cancer screening despite counseling.   Radha Arias MD              [1]   Past Medical History:   Arrhythmia    \"palpitations\"    PAC (premature atrial contraction)    Psoriasis    Seafood allergy   [2]   Past Surgical History:  Procedure Laterality Date    Appendectomy  2025          x 3    Incision and drainage Right     rigth hand (bite by a cat)    Tonsillectomy      Tubal ligation     [3]   Allergies  Allergen Reactions    Fish Oil ANAPHYLAXIS    Seafood ANAPHYLAXIS    Vancomycin SWELLING     Swelling of the mouth, generalized itchiness and flushes.     Benzonatate Coughing   [4]   Family History  Problem Relation Age of Onset    Diabetes Father     Stroke Mother     No Known Problems Brother     Heart Attack Paternal Grandmother 72    Diabetes Paternal Grandmother     No Known Problems Paternal Grandfather     Breast Cancer Neg     Ovarian Cancer Neg     Colon Cancer Neg    [5]   Social History  Socioeconomic History    Marital status:    Occupational History    Occupation: retired stay at home [employer]     Comment: previously worked as    Tobacco Use    Smoking status: Former     Current packs/day: 0.00     Types: Cigarettes     Quit date:      Years since quittin.5    Smokeless tobacco: Never    Tobacco comments:     over 30 years ago   Vaping Use    Vaping status: Never Used   Substance and Sexual Activity    Alcohol use: Yes     Alcohol/week: 2.0 standard drinks of alcohol     Types: 2 Glasses of wine per week     Comment: Wine, weekly    Drug use: No    Sexual activity: Yes     Partners: Male

## (undated) DEVICE — SOLUTION IV 1000ML 0.9% NACL PRESERVATIVE

## (undated) DEVICE — STERILE LATEX POWDER-FREE SURGICAL GLOVESWITH NITRILE COATING: Brand: PROTEXIS

## (undated) DEVICE — SUT MCRYL 3-0 18IN PS-2 ABSRB UD 19MM 3/8 CIR

## (undated) DEVICE — BANDAGE ROLL,100% COTTON, 6 PLY, LARGE: Brand: KERLIX

## (undated) DEVICE — TISSUE RETRIEVAL SYSTEM: Brand: INZII RETRIEVAL SYSTEM

## (undated) DEVICE — LAP CHOLE: Brand: MEDLINE INDUSTRIES, INC.

## (undated) DEVICE — SUTURE ETHILON 4-0 1667G

## (undated) DEVICE — SUCTION CANISTER, 3000CC,SAFELINER: Brand: DEROYAL

## (undated) DEVICE — TROCAR: Brand: KII SHIELDED BLADED ACCESS SYSTEM

## (undated) DEVICE — SOLUTION IRRIG 1000ML 0.9% NACL USP BTL

## (undated) DEVICE — MARYLAND JAW LAPAROSCOPIC SEALER/DIVIDER COATED: Brand: LIGASURE

## (undated) DEVICE — CULTURE TUBE ANAEROBIC

## (undated) DEVICE — GLOVE SUR 8 SENSICARE PI PIP GRN PWD F

## (undated) DEVICE — DECANTER SPIKE TRANSFLOW

## (undated) DEVICE — GAUZE PACKING IODOFORM 1/4X5

## (undated) DEVICE — SUT COAT VCRL+ 0 27IN UR-6 ABSRB VLT ANTIBACT

## (undated) DEVICE — INSUFFLATION NEEDLE TO ESTABLISH PNEUMOPERITONEUM.: Brand: INSUFFLATION NEEDLE

## (undated) DEVICE — TRAY SRGPRP PVP IOD WT SCRB SM

## (undated) DEVICE — ZIMMER® STERILE DISPOSABLE TOURNIQUET CUFF WITH PLC, DUAL PORT, DUAL BLADDER, 18 IN. (46 CM)

## (undated) DEVICE — COVER SGL STRL LGHT HNDL BLU

## (undated) DEVICE — ARTICULATION RELOAD WITH TRI-STAPLE TECHNOLOGY: Brand: ENDO GIA

## (undated) DEVICE — [HIGH FLOW INSUFFLATOR,  DO NOT USE IF PACKAGE IS DAMAGED,  KEEP DRY,  KEEP AWAY FROM SUNLIGHT,  PROTECT FROM HEAT AND RADIOACTIVE SOURCES.]: Brand: PNEUMOSURE

## (undated) DEVICE — UNIVERSAL STAPLER: Brand: ENDO GIA ULTRA

## (undated) DEVICE — Device: Brand: JELCO

## (undated) DEVICE — BIPOLAR FORCEPS CORD,BANANA LEADS: Brand: VALLEYLAB

## (undated) DEVICE — UPPER EXTREMITY: Brand: MEDLINE INDUSTRIES, INC.

## (undated) DEVICE — GLOVE SUR 8 SENSICARE PI PIP CRM PWD F

## (undated) DEVICE — CULTURE COLLECT/TRANSPORT SYS

## (undated) DEVICE — TROCAR: Brand: KII FIOS FIRST ENTRY

## (undated) DEVICE — GLOVE SUR 7 SENSICARE PI MIC PIP CRM PWD F

## (undated) NOTE — ED AVS SNAPSHOT
[facility] Emergency Department  [place] [address]   [place] [address]  Phone:  [phone]  Fax:  [address]   MRN: [id number]    Department:  [facility] Emergency Department   Date of Visit:  6/25/2017 visiting [email or web address].    IF THERE IS ANY CHANGE OR WORSENING OF YOUR CONDITION, CALL YOUR PRIMARY CARE PHYSICIAN AT ONCE OR RETURN IMMEDIATELY TO THE EMERGENCY DEPARTMENT.     If you have been prescribed any medication(s), please fill your prescription

## (undated) NOTE — LETTER
[facility]  Administration of Anesthesia  Cesia HEATON agree to be cared for by a physician anesthesiologist alone and/or with a nurse anesthetist, who is specially trained to monitor me and give me medicine to put me to sleep or keep me comfortable during my procedure    I understand that my anesthesiologist and/or anesthetist is not an employee or agent of [facility] or [employer]. He or she works for [facility]    As the patient asking for anesthesia services, I agree to:  Allow the anesthesiologist (anesthesia doctor) to give me medicine and do additional procedures as necessary. Some examples are: Starting or using an “IV” to give me medicine, fluids or blood during my procedure, and having a breathing tube placed to help me breathe when I’m asleep (intubation). In the event that my heart stops working properly, I understand that my anesthesiologist will make every effort to sustain my life, unless otherwise directed by [facility] Do Not Resuscitate documents.  Tell my anesthesia doctor before my procedure:  If I am pregnant.  The last time that I ate or drank.  iii. All of the medicines I take (including prescriptions, herbal supplements, and pills I can buy without a prescription (including street drugs/illegal medications). Failure to inform my anesthesiologist about these medicines may increase my risk of anesthetic complications.  iv.If I am allergic to anything or have had a reaction to anesthesia before.  I understand how the anesthesia medicine will help me (benefits).  I understand that with any type of anesthesia medicine there are risks:  The most common risks are: nausea, vomiting, sore throat, muscle soreness, damage to my eyes, mouth, or teeth (from breathing tube placement).  Rare risks include: remembering what happened during my procedure, allergic reactions to medications, injury to my airway, heart, lungs, vision, nerves, or  muscles and in extremely rare instances death.  My doctor has explained to me other choices available to me for my care (alternatives).  Pregnant Patients (“epidural”):  I understand that the risks of having an epidural (medicine given into my back to help control pain during labor), include itching, low blood pressure, difficulty urinating, headache or slowing of the baby’s heart. Very rare risks include infection, bleeding, seizure, irregular heart rhythms and nerve injury.  Regional Anesthesia (“spinal”, “epidural”, & “nerve blocks”):  I understand that rare but potential complications include headache, bleeding, infection, seizure, irregular heart rhythms, and nerve injury.    _____________________________________________________________________________  Patient (or Representative) Signature/Relationship to Patient  Date   Time    _____________________________________________________________________________   Name (if used)    Language/Organization   Time    _____________________________________________________________________________  Nurse Anesthetist Signature     Date   Time  _____________________________________________________________________________  Anesthesiologist Signature     Date   Time  I have discussed the procedure and information above with the patient (or patient’s representative) and answered their questions. The patient or their representative has agreed to have anesthesia services.    _____________________________________________________________________________  Witness        Date   Time  I have verified that the signature is that of the patient or patient’s representative, and that it was signed before the procedure  Patient Name: Cesia Moore     : 3/15/1965                 Printed: 2025 at 12:04 PM    Medical Record #: [id number]                                            Page 1 of 1  ----------ANESTHESIA CONSENT----------

## (undated) NOTE — MR AVS SNAPSHOT
After Visit Summary   2/27/2024    Cesia Moore   MRN: [id number]           Visit Information     Date & Time  2/27/2024 11:30 AM Provider  Radha Arias MD [facility] - OB/GYN Dept. Phone  [phone]      Your Vitals Were  Most recent update: 2/27/2024 11:27 AM    BP   143/77    Ht   61\"    Wt   163 lb    LMP   03/01/2014 (Approximate)    BMI   30.80 kg/m²         Allergies as of 2/27/2024  Review status set to Review Complete on 2/27/2024       Noted Reaction Type Reactions    Fish Oil 06/25/2017    ANAPHYLAXIS    Seafood 06/25/2017    ANAPHYLAXIS    Vancomycin 06/27/2017    SWELLING    Swelling of the mouth, generalized itchiness and flushes.     Benzonatate 12/28/2023    Coughing      Your Current Medications        Dosage    Metoprolol Succinate ER 25 MG Oral Tablet 24 Hr       Diagnoses for This Visit    Encounter for gynecological examination without abnormal finding   [354298]  -  Primary  Screening mammogram for breast cancer   [[id number]]    Screening for cervical cancer   [247097]             We Ordered the Following     Normal Orders This Visit    Hpv Dna  High Risk , Thin Prep Collect [[id number] CUSTOM]     THINPREP PAP SMEAR ONLY [[id number] CUSTOM]     ThinPrep PAP Smear [[id number] CUSTOM]     Future Labs/Procedures Expected by Expires    Hpv Dna  High Risk , Thin Prep Collect [[id number] CUSTOM]  2/27/2024 2/27/2025    ZITA ABRAHAN 2D+3D SCREENING BILAT (CPT=77067/80511) [COMBO CPT(R)]  2/27/2024 (Approximate) 2/26/2025    ThinPrep PAP Smear [[id number] CUSTOM]  2/27/2024 2/27/2025      Imaging Scheduling Instructions     Around February 27, 2024   Imaging:   ZITA ABRAHAN 2D+3D SCREENING BILAT (CPT=77067/32075)    Instructions: Your order will generate a \"Scheduling Ticket\" that will be available in [facility] to schedule on your own at a time most convenient to you.      If you do not have a [facility] Account, or if you prefer to speak with someone  to schedule your appointment, please call [facility] Sched[facility] at [phone].                  Did you know that [facility] [place] physicians now offer Video Visits through [employer] for adult patients for a variety of conditions such as allergies, back pain and cold symptoms? Skip the drive and waiting room and online chat with a doctor face-to-face using your web-cam enabled computer or mobile device wherever you are. Video Visits cost $50 and can be paid hassle-free using a credit, debit, or health savings card.  Not active on [employer]? Ask us how to get signed up today!          If you receive a survey from [facility], please take a few minutes to complete it and provide feedback. We strive to deliver the best patient experience and are looking for ways to make improvements. Your feedback will help us do so. For more information on [facility]ei[facility], please visit [email or web address]           No text in SmartText           No text in SmartText